# Patient Record
Sex: MALE | Race: WHITE | Employment: FULL TIME | URBAN - METROPOLITAN AREA
[De-identification: names, ages, dates, MRNs, and addresses within clinical notes are randomized per-mention and may not be internally consistent; named-entity substitution may affect disease eponyms.]

---

## 2019-08-08 ENCOUNTER — HOSPITAL ENCOUNTER (OUTPATIENT)
Dept: PHYSICAL THERAPY | Age: 59
Discharge: HOME | End: 2019-08-08
Payer: COMMERCIAL

## 2019-08-08 PROCEDURE — 97110 THERAPEUTIC EXERCISES: CPT

## 2019-08-08 PROCEDURE — 97161 PT EVAL LOW COMPLEX 20 MIN: CPT

## 2019-08-08 PROCEDURE — 97140 MANUAL THERAPY 1/> REGIONS: CPT

## 2019-08-08 NOTE — PROGRESS NOTES
PT DAILY TREATMENT NOTE 10-18    Patient Name: Fabián Lindsey  Date:2019  : 1960  [x]  Patient  Verified  Payor: BLUE CROSS / Plan: [facility] PPO / Product Type: PPO /    In time:1150  Out time:1242  Total Treatment Time (min): 52  Visit #: 1 of 10    Medicare/BCBS Only   Total Timed Codes (min):  23 1:1 Treatment Time:  52       Treatment Area: Knee pain, left [M25.562]      SUBJECTIVE  Pain Level (0-10 scale): 6  [x]constant []intermittent []improving []worsening []no change since onset     Any medication changes, allergies to medications, adverse drug reactions, diagnosis change, or new procedure performed?: [x] No    [] Yes (see summary sheet for update)  Subjective functional status/changes:       Subjective: Pt S/P Left TKR with a Jiffy Knee replacement on 19 due to years of knee pain, . Pt arrives in moderate pain ambulating on [facility]. Knee gets real stiff with bending    Mechanism of Injury: Surgery    Comorbidities: OA, HTN    FOTO:  in 20 visits Limited community ambulation    Goal: Get as much motion as I can get, be able to get to the floor and stand up for work    PLOF: Able to get to the floor and stand with some pain    Health Status: Poor      What type of work do you do?     Living Situation/Domestic Life: Lives at home with wife 13 steps in home and 4 out front all with stair rails  Mobility: Mod (I)  Self Care (I)    What type of daily activities/hobbies?  Shooting, hunting, , Yard work    Limitations to Activity/Recreation/PLOF: Walking, usual work, bending, limited balance     Barriers: []pain []financial []time []transportation []other    Motivation: High    FABQ Score: []low []elevate    Cognition: A & O x 3    Other:    Risk For Falls:   []No  [x]low []elevate           OBJECTIVE/EXAMINATION  Demonstrated Balance: Fair       SLS: Right NT  Left  NT     Tandem Stance:  NT        Romberg:  EO 30   EC 12  Mobility: Mod (I)  Gait: Left antalgic with SPC, Narrow width with ambulation   - TTP ADD and surrounding knee         AROM PROM Strength Pain? ?    Right Left Right Left Right Left    Hip Flx      2+    Hip Ext          Knee Flx  96    4+    Knee Ext  Seated  -10*  -15*    2    DF          PF          Toe DF          Toe PF          HS 90/90          SLR                                       Modality rationale: decrease inflammation, decrease pain and increase tissue extensibility to improve the patients ability to tolerate post treatment soreness   Min Type Additional Details      [] Estim:  []Unatt       []IFC  []Premod                        []Other:  []w/ice   []w/heat  Position:  Location:      [] Estim: []Att    []TENS instruct  []NMES                    []Other:  []w/US   []w/ice   []w/heat  Position:  Location:      []  Traction: [] Cervical       []Lumbar                       [] Prone          []Supine                       []Intermittent   []Continuous Lbs:  [] before manual  [] after manual      []  Ultrasound: []Continuous   [] Pulsed                           []1MHz   []3MHz W/cm2:  Location:      []  Iontophoresis with dexamethasone         Location: [] Take home patch   [] In clinic     10 [x]  Ice     []  heat  []  Ice massage  []  Laser   []  Anodyne Position:  Location:      []  Laser with stim  []  Other:  Position:  Location:      []  Vasopneumatic Device Pressure:       [] lo [] med [] hi   Temperature: [] lo [] med [] hi    [x] Skin assessment post-treatment:  [x]intact []redness- no adverse reaction    []redness  adverse reaction:      19 min [x]Eval                  []Re-Eval         11 min Therapeutic Exercise:  [x] See flow sheet :   Rationale: increase ROM, increase strength and improve coordination to improve the patients ability to perform normal activvity     8 min Manual Therapy:  Left knee Ext/Flx mobs,    Rationale: decrease pain, increase ROM and increase tissue extensibility to tolerate increased activity  4 min Gait Trainin with SPC device on level surfaces with CGA - SBA level of assist   Rationale: With   [x] TE   [] TA   [] neuro   [] other: Patient Education: [x] Review HEP    [] Progressed/Changed HEP based on:   [] positioning   [] body mechanics   [] transfers   [] heat/ice application    [] other:       Other Objective/Functional Measures:        Pain Level (0-10 scale) post treatment: 4-5     ASSESSMENT/Changes in Function:    - Good tolerance with treatment today  - Improved gait pattern with decreased narrowing width      []  See Plan of Care  []  See progress note/recertification  []  See Discharge Summary         Progress towards goals / Updated goals:  Short Term Goals: To be accomplished in 5 treatments:  1. Pt will be compliant and independent with HEP in order to facilitate PT sessions and aid with self management   Eval Status:  Initiated   Current Status:  2. Pt to tolerate 30 min or more of TE and/or Interventions w/o increased s/s   Eval Status:  Initiated   Current Status:    Long Term Goals: To be accomplished in 10 treatments:  1. Pt will report 50% improvement or better with involvement to show a significant increase in function   Eval Status:  Initiated   Current Status:  2. Pt will demonstrate a reciprocal gait pattern with stair walking to tolerate getting up and down his stairs at home. Eval Status:  Initiated   Current Status:  3. Pt will have increased AROM left Knee flx at 120* to aid with stooping and low level work for progress to return to work as a      Eval Status:  Initiated   Current Status:  4. Pt will demonstrate left knee seated extension to -5* or better to show good strength for carryover to good stability with daily activity. Eval Status:  Initiated   Current Status:  5.   Pt will ambulate 500' (I) with no LOB for carryover to light community ambulation   Eval Status: Initiated   Current Status:  6. Pt will improve FOTO score to 34 in 20 visits to show significant improvement in function for progress to limited community ambulation   Eval Status: 1   Current Status:      PLAN  [x]  Upgrade activities as tolerated     [x]  Continue plan of care  []  Update interventions per flow sheet       []  Discharge due to:_  []  Other:_        Janet Joshua, PT 8/8/2019  11:55 AM    No future appointments.

## 2019-08-08 NOTE — PROGRESS NOTES
[facility] at [address]  [place] [address] [address]  Phone: [phone]      Fax:  [phone]    Plan of Care/ Statement of Necessity for Physical Therapy Services  Patient name: Hilario Bentley Start of Care: 2019   Referral source: Abhay Johnson : 1960    Medical Diagnosis: Knee pain, left [M25.562]  Payor: [employer] / Plan: [facility] PPO / Product Type: PPO /  Onset Date:Surgery 19    Treatment Diagnosis: Left LE weakness and limited mobility and stability   Prior Hospitalization: see medical history Provider#: [id number]   Medications: Verified on Patient summary List    Comorbidities: OA, HTN   Prior Level of Function: Able to get to the floor and stand with some pain      The Plan of Care and following information is based on the information from the initial evaluation. Assessment/ key information: Patient is a 66 y.o. male referred to PT with the above Dx. Patient seen today S/P Left TKR with a Jiffy Knee replacement on 19, due to years of knee pain. Pt arrives in moderate pain ambulating on SPC. He reports that his knee gets real stiff with bending. Patient presents to PT with an impaired gait, decreased balance, decreased strength, decreased flexibility, and decreased mobility. Patient s/s appear to be consistent w/ diagnosis. Patient demonstrates the potential to make functional gains within a reasonable time frame. Patient will benefit from skilled PT to address impairments and improve functional mobility, strength, gait and balance for an improved quality of life.   Fall Risk Assessment: Patient demonstrates a low Fall Risk   Evaluation Complexity History LOW Complexity : Zero comorbidities / personal factors that will impact the outcome / POC; Examination LOW Complexity : 1-2 Standardized tests and measures addressing body structure, function, activity limitation and / or participation in recreation ;Presentation LOW Complexity : Stable, uncomplicated  ;Clinical Decision Making HIGH Complexity : FOTO score of 1- 25   Overall Complexity Rating: LOW   Problem List: pain affecting function, decrease ROM, decrease strength, edema affecting function, impaired gait/ balance, decrease ADL/ functional abilitiies, decrease activity tolerance, decrease flexibility/ joint mobility, decrease transfer abilities and other FOTO = 1   Treatment Plan may include any combination of the following: Therapeutic exercise, Therapeutic activities, Neuromuscular re-education, Physical agent/modality, Gait/balance training, Manual therapy, Patient education, Self Care training, Functional mobility training, Home safety training and Stair training  Patient / Family readiness to learn indicated by: asking questions, trying to perform skills and interest  Persons(s) to be included in education: patient (P)  Barriers to Learning/Limitations: None  Patient Goal (s): Get as much motion as I can get, be able to get to the floor and stand up for work  Patient Self Reported Health Status: poor  Rehabilitation Potential: good    Progress towards goals / Updated goals:  Short Term Goals: To be accomplished in 5 treatments:  1. Pt will be compliant and independent with HEP in order to facilitate PT sessions and aid with self management             Eval Status:  Initiated             Current Status:  2. Pt to tolerate 30 min or more of TE and/or Interventions w/o increased s/s             Eval Status:  Initiated             Current Status:     Long Term Goals: To be accomplished in 10 treatments:  1. Pt will report 50% improvement or better with involvement to show a significant increase in function             Eval Status:  Initiated             Current Status:  2. Pt will demonstrate a reciprocal gait pattern with stair walking to tolerate getting up and down his stairs at home.               Eval Status:  Initiated             Current Status:  3. Pt will have increased AROM left Knee flx at 120* to aid with stooping and low level work for progress to return to work as a                Eval Status:  Initiated             Current Status:  4. Pt will demonstrate left knee seated extension to -5* or better to show good strength for carryover to good stability with daily activity. Eval Status:  Initiated             Current Status:  5. Pt will ambulate 500' (I) with no LOB for carryover to light community ambulation             Eval Status:  Initiated             Current Status:  6. Pt will improve FOTO score to 34 in 20 visits to show significant improvement in function for progress to limited community ambulation             Eval Status: 1             Current Status:   Frequency / Duration: Patient to be seen 2-3 times per week for 10 treatments. Patient/ Caregiver education and instruction: Diagnosis, prognosis, self care, activity modification and exercises   [x]  Plan of care has been reviewed with LEATHA Michael, PT 8/8/2019 8:19 AM  _____________________________________________________________________  I certify that the above Therapy Services are being furnished while the patient is under my care. I agree with the treatment plan and certify that this therapy is necessary.     Physician's Signature:____________Date:_________TIME:________    ** Signature, Date and Time must be completed for valid certification **    Please sign and return to [facility] at [address]  [place], [facility] [address]  Phone: [phone]      Fax:  [phone]

## 2019-08-12 ENCOUNTER — HOSPITAL ENCOUNTER (OUTPATIENT)
Dept: PHYSICAL THERAPY | Age: 59
Discharge: HOME | End: 2019-08-12
Payer: COMMERCIAL

## 2019-08-12 PROCEDURE — 97140 MANUAL THERAPY 1/> REGIONS: CPT

## 2019-08-12 PROCEDURE — 97110 THERAPEUTIC EXERCISES: CPT

## 2019-08-12 NOTE — PROGRESS NOTES
PT DAILY TREATMENT NOTE 10-18    Patient Name: Glen Mccall  Date:2019  : 1960  [x]  Patient  Verified  Payor: BLUE CROSS / Plan: [facility] PPO / Product Type: PPO /    In time:2:00  Out time:3:00  Total Treatment Time (min): 60  Visit #: 2 of 10    Medicare/BCBS Only   Total Timed Codes (min):  60 1:1 Treatment Time:  60       Treatment Area: Knee pain, left [M25.562]    SUBJECTIVE  Pain Level (0-10 scale): 4-5/10  Any medication changes, allergies to medications, adverse drug reactions, diagnosis change, or new procedure performed?: [x] No    [] Yes (see summary sheet for update)  Subjective functional status/changes:   [] No changes reported  Pt reports partial compliance with HEP. OBJECTIVE      52 min Therapeutic Exercise:  [] See flow sheet :   Rationale: increase ROM and increase strength to improve the patients ability to perform ADls with less difficulty. 8 min Manual Therapy:  PROM into flexion, grade II ant/post tibiofemoral joint mobs. Rationale: decrease pain, increase ROM and increase tissue extensibility to perform ADls with less difficulty. With   [] TE   [] TA   [] neuro   [] other: Patient Education: [x] Review HEP    [] Progressed/Changed HEP based on:   [] positioning   [] body mechanics   [] transfers   [] heat/ice application    [] other:      Other Objective/Functional Measures:  Left knee AROM flexion 110 deg, ext -8 deg. Pain Level (0-10 scale) post treatment: 0/10    ASSESSMENT/Changes in Function: Initiated ex. Per flow sheet. Pt reported some difficulty with ex. But was able to perform. ROM continues to improve.     Patient will continue to benefit from skilled PT services to modify and progress therapeutic interventions, address functional mobility deficits, address ROM deficits, address strength deficits, analyze and address soft tissue restrictions, analyze and cue movement patterns, analyze and modify body mechanics/ergonomics and assess and modify postural abnormalities to attain remaining goals. []  See Plan of Care  []  See progress note/recertification  []  See Discharge Summary         Progress towards goals / Updated goals:  Short Term Goals: To be accomplished in 5 treatments:  1. Pt will be compliant and independent with HEP in order to facilitate PT sessions and aid with self management             Eval Status:  Initiated             Current Status:Progressing. Pt reports partial compliance. 8/12/19  2. Pt to tolerate 30 min or more of TE and/or Interventions w/o increased s/s             Eval Status:  Initiated             Current Status:     Long Term Goals: To be accomplished in 10 treatments:  1. Pt will report 50% improvement or better with involvement to show a significant increase in function             Eval Status:  Initiated             Current Status:  2. Pt will demonstrate a reciprocal gait pattern with stair walking to tolerate getting up and down his stairs at home. Eval Status:  Initiated             Current Status:  3. Pt will have increased AROM left Knee flx at 120* to aid with stooping and low level work for progress to return to work as a                Eval Status:  Initiated             Current Status:  4. Pt will demonstrate left knee seated extension to -5* or better to show good strength for carryover to good stability with daily activity. Eval Status:  Initiated             Current Status:  5. Pt will ambulate 500' (I) with no LOB for carryover to light community ambulation             Eval Status:  Initiated             Current Status:  6.   Pt will improve FOTO score to 34 in 20 visits to show significant improvement in function for progress to limited community ambulation             Eval Status: 1    PLAN  [x]  Upgrade activities as tolerated     [x]  Continue plan of care  []  Update interventions per flow sheet       []  Discharge due to:_  []  Other:_ Kaitlyn Ugarte PTA 8/12/2019  2:12 PM    Future Appointments   Date Time Provider Rachel Bauer   8/14/2019  2:00 PM Arcelia Lennert [facility]   8/19/2019  2:30 PM Arcelia Lennert [facility]   8/21/2019  2:30 PM Arcelia Lennert [facility]   8/23/2019  8:30 AM Lou Bee PT [facility]   8/26/2019 10:30 AM Arcelia Lennert [facility]   8/28/2019  9:30 AM Arcelia Lennert [facility]   8/30/2019  8:30 AM Arcelia Lennert [facility]   9/3/2019 11:00 AM Ja Bridges, PT [facility]   9/5/2019  9:30 AM Zander Szymanski PTA [facility]   9/9/2019  8:30 AM Zander Szymanski PTA [facility]   9/11/2019  8:00 AM Zander Szymanski PTA [facility]   9/13/2019  8:00 AM Ja Bridges PT [facility]

## 2019-08-14 ENCOUNTER — HOSPITAL ENCOUNTER (OUTPATIENT)
Dept: PHYSICAL THERAPY | Age: 59
Discharge: HOME | End: 2019-08-14
Payer: COMMERCIAL

## 2019-08-14 PROCEDURE — 97110 THERAPEUTIC EXERCISES: CPT

## 2019-08-14 PROCEDURE — 97140 MANUAL THERAPY 1/> REGIONS: CPT

## 2019-08-14 NOTE — PROGRESS NOTES
PT DAILY TREATMENT NOTE 10-18    Patient Name: Maude Wynn  Date:2019  : 1960  [x]  Patient  Verified  Payor: BLUE CROSS / Plan: [facility] PPO / Product Type: PPO /    In time:2:00  Out time:2:40  Total Treatment Time (min): 40  Visit #: 3 of 10    Medicare/BCBS Only   Total Timed Codes (min):  40 1:1 Treatment Time:  40       Treatment Area: Knee pain, left [M25.562]    SUBJECTIVE  Pain Level (0-10 scale): 4-5/10  Any medication changes, allergies to medications, adverse drug reactions, diagnosis change, or new procedure performed?: [x] No    [] Yes (see summary sheet for update)  Subjective functional status/changes:   [] No changes reported  Pt reports partial compliance with HEP. OBJECTIVE      32 min Therapeutic Exercise:  [] See flow sheet :   Rationale: increase ROM and increase strength to improve the patients ability to perform ADls with less difficulty. 8 min Manual Therapy:  PROM into flexion, grade II ant/post tibiofemoral joint mobs. Rationale: decrease pain, increase ROM and increase tissue extensibility to perform ADls with less difficulty. With   [] TE   [] TA   [] neuro   [] other: Patient Education: [x] Review HEP    [] Progressed/Changed HEP based on:   [] positioning   [] body mechanics   [] transfers   [] heat/ice application    [] other:      Other Objective/Functional Measures: Added heel slide    Pain Level (0-10 scale) post treatment: 5-6/10    ASSESSMENT/Changes in Function: Progressed ex. Per flow sheet. Pt reported some difficulty with ex. But was able to perform. ROM continues to improve.     Patient will continue to benefit from skilled PT services to modify and progress therapeutic interventions, address functional mobility deficits, address ROM deficits, address strength deficits, analyze and address soft tissue restrictions, analyze and cue movement patterns, analyze and modify body mechanics/ergonomics and assess and modify postural abnormalities to attain remaining goals. []  See Plan of Care  []  See progress note/recertification  []  See Discharge Summary         Progress towards goals / Updated goals:  Short Term Goals: To be accomplished in 5 treatments:  1. Pt will be compliant and independent with HEP in order to facilitate PT sessions and aid with self management             Eval Status:  Initiated             Current Status:Progressing. Pt reports partial compliance. 8/12/19  2. Pt to tolerate 30 min or more of TE and/or Interventions w/o increased s/s             Eval Status:  Initiated             Current Status:     Long Term Goals: To be accomplished in 10 treatments:  1. Pt will report 50% improvement or better with involvement to show a significant increase in function             Eval Status:  Initiated             Current Status:  2. Pt will demonstrate a reciprocal gait pattern with stair walking to tolerate getting up and down his stairs at home. Eval Status:  Initiated             Current Status:  3. Pt will have increased AROM left Knee flx at 120* to aid with stooping and low level work for progress to return to work as a                Eval Status:  Initiated             Current Status:  4. Pt will demonstrate left knee seated extension to -5* or better to show good strength for carryover to good stability with daily activity. Eval Status:  Initiated             Current Status:  5. Pt will ambulate 500' (I) with no LOB for carryover to light community ambulation             Eval Status:  Initiated             Current Status:  6.   Pt will improve FOTO score to 34 in 20 visits to show significant improvement in function for progress to limited community ambulation             Eval Status: 1    PLAN  [x]  Upgrade activities as tolerated     [x]  Continue plan of care  []  Update interventions per flow sheet       []  Discharge due to:_  []  Other:_      Yasmine Gooden PTA 8/14/2019  2:12 PM    Future Appointments   Date Time Provider Rachel Bauer   8/19/2019  2:30 PM Lucretia Lemons [facility]   8/21/2019  2:30 PM Lucretia Lemons [facility]   8/23/2019  8:30 AM Keely Ruffin, PT [facility]   8/26/2019 10:30 AM Lucretia Lemons [facility]   8/28/2019  9:30 AM Everton Shallow, PTA [facility]   8/30/2019  8:30 AM Everton Shallow, PTA [facility]   9/3/2019 11:00 AM Bell Smoke, PT [facility]   9/5/2019  9:30 AM Everton Shallow, PTA [facility]   9/9/2019  8:30 AM [place] Shallow, PTA [facility]   9/11/2019  8:00 AM [place] Shallow, PTA [facility]   9/13/2019  8:00 AM Bell Smoke, PT [facility]

## 2019-08-19 ENCOUNTER — HOSPITAL ENCOUNTER (OUTPATIENT)
Dept: PHYSICAL THERAPY | Age: 59
Discharge: HOME | End: 2019-08-19
Payer: COMMERCIAL

## 2019-08-19 PROCEDURE — 97110 THERAPEUTIC EXERCISES: CPT

## 2019-08-19 PROCEDURE — 97140 MANUAL THERAPY 1/> REGIONS: CPT

## 2019-08-19 NOTE — PROGRESS NOTES
PT DAILY TREATMENT NOTE 10-18    Patient Name: Sita Fisher  Date:2019  : 1960  [x]  Patient  Verified  Payor: BLUE CROSS / Plan: [facility] PPO / Product Type: PPO /    In time:2:30  Out time:3:25  Total Treatment Time (min): 55  Visit #: 4 of 10    Medicare/BCBS Only   Total Timed Codes (min):  55 1:1 Treatment Time:  40       Treatment Area: Knee pain, left [M25.562]    SUBJECTIVE  Pain Level (0-10 scale): 4/10  Any medication changes, allergies to medications, adverse drug reactions, diagnosis change, or new procedure performed?: [x] No    [] Yes (see summary sheet for update)  Subjective functional status/changes:   [] No changes reported  \"Feel okay. \"    OBJECTIVE    Modality rationale: decrease inflammation and decrease pain to improve the patients ability to perform walking activities with less difficulty.    Min Type Additional Details    [] Estim:  []Unatt       []IFC  []Premod                        []Other:  []w/ice   []w/heat  Position:  Location:    [] Estim: []Att    []TENS instruct  []NMES                    []Other:  []w/US   []w/ice   []w/heat  Position:  Location:    []  Traction: [] Cervical       []Lumbar                       [] Prone          []Supine                       []Intermittent   []Continuous Lbs:  [] before manual  [] after manual    []  Ultrasound: []Continuous   [] Pulsed                           []1MHz   []3MHz Location:  W/cm2:    []  Iontophoresis with dexamethasone         Location: [] Take home patch   [] In clinic   8 [x]  Ice     []  heat  []  Ice massage  []  Laser   []  Anodyne Position: reclined with wedge  Location:left knee    []  Laser with stim  []  Other: Position:  Location:    []  Vasopneumatic Device Pressure:       [] lo [] med [] hi   Temperature: [] lo [] med [] hi   [x] Skin assessment post-treatment:  [x]intact [x]redness- no adverse reaction    []redness  adverse reaction:     39 min Therapeutic Exercise:  [] See flow sheet :   Rationale: increase ROM and increase strength to improve the patients ability to perform ADls with less difficulty. 8 min Manual Therapy:  PROM into flexion, grade II ant/post tibiofemoral joint mobs. Rationale: decrease pain, increase ROM and increase tissue extensibility to perform ADls with less difficulty. With   [] TE   [] TA   [] neuro   [] other: Patient Education: [x] Review HEP    [] Progressed/Changed HEP based on:   [] positioning   [] body mechanics   [] transfers   [] heat/ice application    [] other:      Other Objective/Functional Measures: Left knee flexion 115 deg    Pain Level (0-10 scale) post treatment: 4/10    ASSESSMENT/Changes in Function: Continued with current ex. Per flow sheet. Pt reported fatigue with windshield ex. But was able to perform. AROM continues to improve. Patient will continue to benefit from skilled PT services to modify and progress therapeutic interventions, address functional mobility deficits, address ROM deficits, address strength deficits, analyze and address soft tissue restrictions, analyze and cue movement patterns, analyze and modify body mechanics/ergonomics and assess and modify postural abnormalities to attain remaining goals. []  See Plan of Care  []  See progress note/recertification  []  See Discharge Summary         Progress towards goals / Updated goals:  Short Term Goals: To be accomplished in 5 treatments:  1. Pt will be compliant and independent with HEP in order to facilitate PT sessions and aid with self management             Eval Status:  Initiated             Current Status:Progressing. Pt reports partial compliance. 8/12/19  2. Pt to tolerate 30 min or more of TE and/or Interventions w/o increased s/s             Eval Status:  Initiated             Current Status: MET. Pt is able to tolerate 30 mins of TE without discomfort. 8/19/19      Long Term Goals: To be accomplished in 10 treatments:  1.   Pt will report 50% improvement or better with involvement to show a significant increase in function             Eval Status:  Initiated             Current Status:  2. Pt will demonstrate a reciprocal gait pattern with stair walking to tolerate getting up and down his stairs at home. Eval Status:  Initiated             Current Status:  3. Pt will have increased AROM left Knee flx at 120* to aid with stooping and low level work for progress to return to work as a                Eval Status:  Initiated             Current Status:Progressing. Left knee 115 deg. 8/19/19  4. Pt will demonstrate left knee seated extension to -5* or better to show good strength for carryover to good stability with daily activity. Eval Status:  Initiated             Current Status:  5. Pt will ambulate 500' (I) with no LOB for carryover to light community ambulation             Eval Status:  Initiated             Current Status:  6.   Pt will improve FOTO score to 34 in 20 visits to show significant improvement in function for progress to limited community ambulation             Eval Status: 1    PLAN  [x]  Upgrade activities as tolerated     [x]  Continue plan of care  []  Update interventions per flow sheet       []  Discharge due to:_  []  Other:_      Peggy Perdomo PTA 8/19/2019  2:12 PM    Future Appointments   Date Time Provider Rachel Bauer   8/21/2019  2:30 PM Khurram Doyle [facility]   8/23/2019  8:30 AM PEDRO Tuttle   8/26/2019 10:30 AM Jenaro Shukla PTA [facility]   8/28/2019  9:30 AM Jenaro Shukla PTA [facility]   8/30/2019  8:30 AM Jenaro Shukla PTA [facility]   9/3/2019 11:00 AM Verónica Farooq PT [facility]   9/5/2019  9:30 AM Jenaro Shukla PTA [facility]   9/9/2019  8:30 AM [facility]   9/11/2019  8:00 AM [facility]   9/13/2019  8:00 AM Verónica Farooq, PT [facility]

## 2019-08-21 ENCOUNTER — HOSPITAL ENCOUNTER (OUTPATIENT)
Dept: PHYSICAL THERAPY | Age: 59
Discharge: HOME | End: 2019-08-21
Payer: COMMERCIAL

## 2019-08-21 PROCEDURE — 97110 THERAPEUTIC EXERCISES: CPT

## 2019-08-21 NOTE — PROGRESS NOTES
PT DAILY TREATMENT NOTE 10-18    Patient Name: Thomas Mary  Date:2019  : 1960  [x]  Patient  Verified  Payor: BLUE CROSS / Plan: [facility] PPO / Product Type: PPO /    In time:2:30  Out time:3:15  Total Treatment Time (min): 55  Visit #: 5 of 10    Medicare/BCBS Only   Total Timed Codes (min):  37 1:1 Treatment Time:  30       Treatment Area: Knee pain, left [M25.562]    SUBJECTIVE  Pain Level (0-10 scale): 410  Any medication changes, allergies to medications, adverse drug reactions, diagnosis change, or new procedure performed?: [x] No    [] Yes (see summary sheet for update)  Subjective functional status/changes:   [] No changes reported  \"Feel okay. \"    OBJECTIVE    Modality rationale: decrease inflammation and decrease pain to improve the patients ability to perform walking activities with less difficulty.    Min Type Additional Details    [] Estim:  []Unatt       []IFC  []Premod                        []Other:  []w/ice   []w/heat  Position:  Location:    [] Estim: []Att    []TENS instruct  []NMES                    []Other:  []w/US   []w/ice   []w/heat  Position:  Location:    []  Traction: [] Cervical       []Lumbar                       [] Prone          []Supine                       []Intermittent   []Continuous Lbs:  [] before manual  [] after manual    []  Ultrasound: []Continuous   [] Pulsed                           []1MHz   []3MHz Location:  W/cm2:    []  Iontophoresis with dexamethasone         Location: [] Take home patch   [] In clinic   8 [x]  Ice     []  heat  []  Ice massage  []  Laser   []  Anodyne Position: reclined with wedge  Location:left knee    []  Laser with stim  []  Other: Position:  Location:    []  Vasopneumatic Device Pressure:       [] lo [] med [] hi   Temperature: [] lo [] med [] hi   [x] Skin assessment post-treatment:  [x]intact [x]redness- no adverse reaction    []redness  adverse reaction:     37 min Therapeutic Exercise:  [] See flow sheet :   Rationale: increase ROM and increase strength to improve the patients ability to perform ADls with less difficulty. With   [] TE   [] TA   [] neuro   [] other: Patient Education: [x] Review HEP    [] Progressed/Changed HEP based on:   [] positioning   [] body mechanics   [] transfers   [] heat/ice application    [] other:      Other Objective/Functional Measures:     Pain Level (0-10 scale) post treatment: 4-5/10    ASSESSMENT/Changes in Function: Continued with current ex. Per flow sheet. Pt continues to report fatigue and increased pain/discomfort with ex. But was able to perform all ex. Patient will continue to benefit from skilled PT services to modify and progress therapeutic interventions, address functional mobility deficits, address ROM deficits, address strength deficits, analyze and address soft tissue restrictions, analyze and cue movement patterns, analyze and modify body mechanics/ergonomics and assess and modify postural abnormalities to attain remaining goals. []  See Plan of Care  []  See progress note/recertification  []  See Discharge Summary         Progress towards goals / Updated goals:  Short Term Goals: To be accomplished in 5 treatments:  1. Pt will be compliant and independent with HEP in order to facilitate PT sessions and aid with self management             Eval Status:  Initiated             Current Status:Progressing. Pt reports partial compliance. 8/12/19  2. Pt to tolerate 30 min or more of TE and/or Interventions w/o increased s/s             Eval Status:  Initiated             Current Status: MET. Pt is able to tolerate 30 mins of TE without discomfort. 8/19/19      Long Term Goals: To be accomplished in 10 treatments:  1. Pt will report 50% improvement or better with involvement to show a significant increase in function             Eval Status:  Initiated             Current Status:  2.   Pt will demonstrate a reciprocal gait pattern with stair walking to tolerate getting up and down his stairs at home. Eval Status:  Initiated             Current Status:  3. Pt will have increased AROM left Knee flx at 120* to aid with stooping and low level work for progress to return to work as a                Eval Status:  Initiated             Current Status:Progressing. Left knee 115 deg. 8/19/19  4. Pt will demonstrate left knee seated extension to -5* or better to show good strength for carryover to good stability with daily activity. Eval Status:  Initiated             Current Status:  5. Pt will ambulate 500' (I) with no LOB for carryover to light community ambulation             Eval Status:  Initiated             Current Status:  6.   Pt will improve FOTO score to 34 in 20 visits to show significant improvement in function for progress to limited community ambulation             Eval Status: 1    PLAN  [x]  Upgrade activities as tolerated     [x]  Continue plan of care  []  Update interventions per flow sheet       []  Discharge due to:_  []  Other:_      Becky Hill PTA 8/21/2019  2:12 PM    Future Appointments   Date Time Provider Rachel Bauer   8/23/2019  8:30 AM Luis Payne PT [facility]   8/26/2019 10:30 AM Vince Lane PTA [facility]   8/28/2019  9:30 AM Vince Lane PTA [facility]   8/30/2019  8:30 AM Vince Lane PTA [facility]   9/3/2019 11:00 AM Lidia Smallwood PT [facility]   9/5/2019  9:30 AM Vince Lane PTA [facility]   9/9/2019  8:30 AM Vince Lane PTA [facility]   9/11/2019  8:00 AM Vince Lane PTA [facility]   9/13/2019  8:00 AM Lidia Smallwood, [facility]

## 2019-08-23 ENCOUNTER — HOSPITAL ENCOUNTER (OUTPATIENT)
Dept: PHYSICAL THERAPY | Age: 59
Discharge: HOME | End: 2019-08-23
Payer: COMMERCIAL

## 2019-08-23 PROCEDURE — 97140 MANUAL THERAPY 1/> REGIONS: CPT

## 2019-08-23 PROCEDURE — 97110 THERAPEUTIC EXERCISES: CPT

## 2019-08-23 NOTE — PROGRESS NOTES
PT DAILY TREATMENT NOTE 10-18    Patient Name: Dexter Abarca  Date:2019  : 1960  [x]  Patient  Verified  Payor: BLUE CROSS / Plan: [id number] PPO / Product Type: PPO /    In time:8:30  Out time:9:40  Total Treatment Time (min): 70  Visit #: 6 of 10    Medicare/BCBS Only   Total Timed Codes (min):  62 1:1 Treatment Time:  62       Treatment Area: Knee pain, left [M25.562]    SUBJECTIVE  Pain Level (0-10 scale): 10  Any medication changes, allergies to medications, adverse drug reactions, diagnosis change, or new procedure performed?: [x] No    [] Yes (see summary sheet for update)  Subjective functional status/changes:   [] No changes reported  \"Feel okay. \"    OBJECTIVE    Modality rationale: decrease inflammation and decrease pain to improve the patients ability to perform walking activities with less difficulty.    Min Type Additional Details    [] Estim:  []Unatt       []IFC  []Premod                        []Other:  []w/ice   []w/heat  Position:  Location:    [] Estim: []Att    []TENS instruct  []NMES                    []Other:  []w/US   []w/ice   []w/heat  Position:  Location:    []  Traction: [] Cervical       []Lumbar                       [] Prone          []Supine                       []Intermittent   []Continuous Lbs:  [] before manual  [] after manual    []  Ultrasound: []Continuous   [] Pulsed                           []1MHz   []3MHz Location:  W/cm2:    []  Iontophoresis with dexamethasone         Location: [] Take home patch   [] In clinic   8 [x]  Ice     []  heat  []  Ice massage  []  Laser   []  Anodyne Position: reclined with wedge  Location:left knee    []  Laser with stim  []  Other: Position:  Location:    []  Vasopneumatic Device Pressure:       [] lo [] med [] hi   Temperature: [] lo [] med [] hi   [x] Skin assessment post-treatment:  [x]intact [x]redness- no adverse reaction    []redness  adverse reaction:     54 min Therapeutic Exercise:  [] See flow sheet :   Rationale: increase ROM and increase strength to improve the patients ability to perform ADls with less difficulty. 8 min Manual Therapy:  STM/edema to left knee. Rationale: decrease pain, increase ROM and increase tissue extensibility to perform ADls with less difficulty. With   [] TE   [] TA   [] neuro   [] other: Patient Education: [x] Review HEP    [] Progressed/Changed HEP based on:   [] positioning   [] body mechanics   [] transfers   [] heat/ice application    [] other:      Other Objective/Functional Measures: 55% improvement since SOC. Left knee AROM -3 -115 deg    Pain Level (0-10 scale) post treatment: 5/10    ASSESSMENT/Changes in Function: Increased step height, increased reps. Added 2lbs to SAQ. Pt reported slight discomfort duringheel slide ex., but was able to perform. Patient will continue to benefit from skilled PT services to modify and progress therapeutic interventions, address functional mobility deficits, address ROM deficits, address strength deficits, analyze and address soft tissue restrictions, analyze and cue movement patterns, analyze and modify body mechanics/ergonomics and assess and modify postural abnormalities to attain remaining goals. []  See Plan of Care  []  See progress note/recertification  []  See Discharge Summary         Progress towards goals / Updated goals:  Short Term Goals: To be accomplished in 5 treatments:  1. Pt will be compliant and independent with HEP in order to facilitate PT sessions and aid with self management             Eval Status:  Initiated             Current Status:Progressing. Pt reports partial compliance. 8/12/19  2. Pt to tolerate 30 min or more of TE and/or Interventions w/o increased s/s             Eval Status:  Initiated             Current Status: MET. Pt is able to tolerate 30 mins of TE without discomfort. 8/19/19      Long Term Goals: To be accomplished in 10 treatments:  1.   Pt will report 50% improvement or better with involvement to show a significant increase in function             Eval Status:  Initiated             Current Status:MET. Pt reports 55% improvement since [facility]. 8/23/19  2. Pt will demonstrate a reciprocal gait pattern with stair walking to tolerate getting up and down his stairs at home. Eval Status:  Initiated             Current Status:  3. Pt will have increased AROM left Knee flx at 120* to aid with stooping and low level work for progress to return to work as a                Eval Status:  Initiated             Current Status:Progressing. Left knee 115 deg. 8/23/19  4. Pt will demonstrate left knee seated extension to -5* or better to show good strength for carryover to good stability with daily activity. Eval Status:  Initiated             Current Status:  5. Pt will ambulate 500' (I) with no LOB for carryover to light community ambulation             Eval Status:  Initiated             Current Status:  6.   Pt will improve FOTO score to 34 in 20 visits to show significant improvement in function for progress to limited community ambulation             Eval Status: 1    PLAN  [x]  Upgrade activities as tolerated     [x]  Continue plan of care  []  Update interventions per flow sheet       []  Discharge due to:_  []  Other:_      Alex Marie, PTA 8/23/2019  2:12 PM    Future Appointments   Date Time Provider Rachel Bauer   8/26/2019 10:30 AM Olegopia Chad, PTA [facility]   8/28/2019  9:30 AM Olevia Balling, PTA [facility]   8/30/2019  8:30 AM Olevia Balling, PTA [facility]   9/3/2019 11:00 AM Cuba Cabrera, PT [facility]   9/5/2019  9:30 AM Olegopia Bishnuing, PTA [facility]   9/9/2019  8:30 AM Olegopia Chad, PTA [facility]   9/11/2019  8:00 AM Rocio Bonilla, PTA [facility]   9/13/2019  8:00 AM Cuba Cabrera, PT [facility]

## 2019-08-26 ENCOUNTER — HOSPITAL ENCOUNTER (OUTPATIENT)
Dept: PHYSICAL THERAPY | Age: 59
Discharge: HOME | End: 2019-08-26
Payer: COMMERCIAL

## 2019-08-26 PROCEDURE — 97140 MANUAL THERAPY 1/> REGIONS: CPT

## 2019-08-26 PROCEDURE — 97110 THERAPEUTIC EXERCISES: CPT

## 2019-08-26 NOTE — PROGRESS NOTES
PT DAILY TREATMENT NOTE 10-18    Patient Name: Miguel Ángel Henderson  Date:2019  : 1960  [x]  Patient  Verified  Payor: BLUE CROSS / Plan: [facility] PPO / Product Type: PPO /    In time:10:30  Out time:11:40  Total Treatment Time (min): 60  Visit #: 7 of 10    Medicare/BCBS Only   Total Timed Codes (min):  52 1:1 Treatment Time:  42       Treatment Area: Knee pain, left [M25.562]    SUBJECTIVE  Pain Level (0-10 scale): 2-3/10  Any medication changes, allergies to medications, adverse drug reactions, diagnosis change, or new procedure performed?: [x] No    [] Yes (see summary sheet for update)  Subjective functional status/changes:   [] No changes reported  \"Last night I had that sharp pain right on the top of my knee it went away after a little bit, but it hurt really bad. \"    OBJECTIVE    Modality rationale: decrease inflammation and decrease pain to improve the patients ability to perform walking activities with less difficulty.    Min Type Additional Details    [] Estim:  []Unatt       []IFC  []Premod                        []Other:  []w/ice   []w/heat  Position:  Location:    [] Estim: []Att    []TENS instruct  []NMES                    []Other:  []w/US   []w/ice   []w/heat  Position:  Location:    []  Traction: [] Cervical       []Lumbar                       [] Prone          []Supine                       []Intermittent   []Continuous Lbs:  [] before manual  [] after manual    []  Ultrasound: []Continuous   [] Pulsed                           []1MHz   []3MHz Location:  W/cm2:    []  Iontophoresis with dexamethasone         Location: [] Take home patch   [] In clinic   8 [x]  Ice     []  heat  []  Ice massage  []  Laser   []  Anodyne Position: reclined with wedge  Location:left knee    []  Laser with stim  []  Other: Position:  Location:    []  Vasopneumatic Device Pressure:       [] lo [] med [] hi   Temperature: [] lo [] med [] hi   [x] Skin assessment post-treatment:  [x]intact [x]redness- no adverse reaction    []redness  adverse reaction:     44 min Therapeutic Exercise:  [] See flow sheet :   Rationale: increase ROM and increase strength to improve the patients ability to perform ADls with less difficulty. 8 min Manual Therapy:  PROM into flexion, grade II ant/post tibiofemoral joint mobs. Rationale: decrease pain, increase ROM and increase tissue extensibility to perform ADls with less difficulty. With   [] TE   [] TA   [] neuro   [] other: Patient Education: [x] Review HEP    [] Progressed/Changed HEP based on:   [] positioning   [] body mechanics   [] transfers   [] heat/ice application    [] other:      Other Objective/Functional Measures    Pain Level (0-10 scale) post treatment: 4/10    ASSESSMENT/Changes in Function: Continued with progressed ex. Pt. Continues to report discomfort during heel slide ex. But was able to perform all reps. Advised pt. To not bend knee as far. Patient will continue to benefit from skilled PT services to modify and progress therapeutic interventions, address functional mobility deficits, address ROM deficits, address strength deficits, analyze and address soft tissue restrictions, analyze and cue movement patterns, analyze and modify body mechanics/ergonomics and assess and modify postural abnormalities to attain remaining goals. []  See Plan of Care  []  See progress note/recertification  []  See Discharge Summary         Progress towards goals / Updated goals:  Short Term Goals: To be accomplished in 5 treatments:  1. Pt will be compliant and independent with HEP in order to facilitate PT sessions and aid with self management             Eval Status:  Initiated             Current Status:Progressing. Pt reports partial compliance. 8/12/19  2. Pt to tolerate 30 min or more of TE and/or Interventions w/o increased s/s             Eval Status:  Initiated             Current Status: MET.  Pt is able to tolerate 30 mins of TE without discomfort. 8/19/19      Long Term Goals: To be accomplished in 10 treatments:  1. Pt will report 50% improvement or better with involvement to show a significant increase in function             Eval Status:  Initiated             Current Status:MET. Pt reports 55% improvement since [facility]. 8/23/19  2. Pt will demonstrate a reciprocal gait pattern with stair walking to tolerate getting up and down his stairs at home. Eval Status:  Initiated             Current Status:  3. Pt will have increased AROM left Knee flx at 120* to aid with stooping and low level work for progress to return to work as a                Eval Status:  Initiated             Current Status:Progressing. Left knee 115 deg. 8/23/19  4. Pt will demonstrate left knee seated extension to -5* or better to show good strength for carryover to good stability with daily activity. Eval Status:  Initiated             Current Status:  5. Pt will ambulate 500' (I) with no LOB for carryover to light community ambulation             Eval Status:  Initiated             Current Status:  6.   Pt will improve FOTO score to 34 in 20 visits to show significant improvement in function for progress to limited community ambulation             Eval Status: 1    PLAN  [x]  Upgrade activities as tolerated     [x]  Continue plan of care  []  Update interventions per flow sheet       []  Discharge due to:_  []  Other:_      Kaleigh Friedman PTA 8/26/2019  2:12 PM    Future Appointments   Date Time Provider Rachel Bauer   8/28/2019  9:30 AM Severo Douglas PTA [facility]   8/30/2019  8:30 AM Severo Douglas PTA [facility]   9/3/2019 11:00 AM Dharmesh Glover, PT [facility]   9/9/2019  8:30 AM Severo Douglas PTA [facility]   9/11/2019  8:00 AM Severo Douglas PTA [facility]   9/13/2019  8:00 AM Dahrmesh Glover, PT [facility]

## 2019-08-28 ENCOUNTER — HOSPITAL ENCOUNTER (OUTPATIENT)
Dept: PHYSICAL THERAPY | Age: 59
Discharge: HOME | End: 2019-08-28
Payer: COMMERCIAL

## 2019-08-28 PROCEDURE — 97110 THERAPEUTIC EXERCISES: CPT

## 2019-08-28 PROCEDURE — 97140 MANUAL THERAPY 1/> REGIONS: CPT

## 2019-08-28 NOTE — PROGRESS NOTES
PT DAILY TREATMENT NOTE 10-18    Patient Name: Stephania Bowen  Date:2019  : 1960  [x]  Patient  Verified  Payor: BLUE CROSS / Plan: [facility] PPO / Product Type: PPO /    In time:9:29  Out time:10:25  Total Treatment Time (min): 57  Visit #: 8 of 10    Medicare/BCBS Only   Total Timed Codes (min):  49 1:1 Treatment Time:  30       Treatment Area: Knee pain, left [M25.562]    SUBJECTIVE  Pain Level (0-10 scale): 4-5/10  Any medication changes, allergies to medications, adverse drug reactions, diagnosis change, or new procedure performed?: [x] No    [] Yes (see summary sheet for update)  Subjective functional status/changes:   [] No changes reported  \"I haven't had any of that sharp pain since Monday morning. \"    OBJECTIVE    Modality rationale: decrease inflammation and decrease pain to improve the patients ability to perform walking activities with less difficulty.    Min Type Additional Details    [] Estim:  []Unatt       []IFC  []Premod                        []Other:  []w/ice   []w/heat  Position:  Location:    [] Estim: []Att    []TENS instruct  []NMES                    []Other:  []w/US   []w/ice   []w/heat  Position:  Location:    []  Traction: [] Cervical       []Lumbar                       [] Prone          []Supine                       []Intermittent   []Continuous Lbs:  [] before manual  [] after manual    []  Ultrasound: []Continuous   [] Pulsed                           []1MHz   []3MHz Location:  W/cm2:    []  Iontophoresis with dexamethasone         Location: [] Take home patch   [] In clinic   8 [x]  Ice     []  heat  []  Ice massage  []  Laser   []  Anodyne Position: reclined with wedge  Location:left knee    []  Laser with stim  []  Other: Position:  Location:    []  Vasopneumatic Device Pressure:       [] lo [] med [] hi   Temperature: [] lo [] med [] hi   [x] Skin assessment post-treatment:  [x]intact [x]redness- no adverse reaction    []redness  adverse reaction: 41 min Therapeutic Exercise:  [] See flow sheet :   Rationale: increase ROM and increase strength to improve the patients ability to perform ADls with less difficulty. 8 min Manual Therapy:  STM/edema massage to left knee, quads and ITB in reclined position. Rationale: decrease pain, increase ROM and increase tissue extensibility to perform ADls with less difficulty. With   [] TE   [] TA   [] neuro   [] other: Patient Education: [x] Review HEP    [] Progressed/Changed HEP based on:   [] positioning   [] body mechanics   [] transfers   [] heat/ice application    [] other:      Other Objective/Functional Measures    Pain Level (0-10 scale) post treatment: 4-5/10    ASSESSMENT/Changes in Function: Continued with progressed ex. Pt continues to feel challenged with ex. But was able to perform with less discomfort. Patient will continue to benefit from skilled PT services to modify and progress therapeutic interventions, address functional mobility deficits, address ROM deficits, address strength deficits, analyze and address soft tissue restrictions, analyze and cue movement patterns, analyze and modify body mechanics/ergonomics and assess and modify postural abnormalities to attain remaining goals. []  See Plan of Care  []  See progress note/recertification  []  See Discharge Summary         Progress towards goals / Updated goals:  Short Term Goals: To be accomplished in 5 treatments:  1. Pt will be compliant and independent with HEP in order to facilitate PT sessions and aid with self management             Eval Status:  Initiated             Current Status:Progressing. Pt reports partial compliance. 8/12/19  2. Pt to tolerate 30 min or more of TE and/or Interventions w/o increased s/s             Eval Status:  Initiated             Current Status: MET. Pt is able to tolerate 30 mins of TE without discomfort. 8/19/19      Long Term Goals: To be accomplished in 10 treatments:  1.   Pt will report 50% improvement or better with involvement to show a significant increase in function             Eval Status:  Initiated             Current Status:MET. Pt reports 55% improvement since [facility]. 8/23/19  2. Pt will demonstrate a reciprocal gait pattern with stair walking to tolerate getting up and down his stairs at home. Eval Status:  Initiated             Current Status:  3. Pt will have increased AROM left Knee flx at 120* to aid with stooping and low level work for progress to return to work as a                Eval Status:  Initiated             Current Status:Progressing. Left knee 115 deg. 8/23/19  4. Pt will demonstrate left knee seated extension to -5* or better to show good strength for carryover to good stability with daily activity. Eval Status:  Initiated             Current Status:  5. Pt will ambulate 500' (I) with no LOB for carryover to light community ambulation             Eval Status:  Initiated             Current Status:  6.   Pt will improve FOTO score to 34 in 20 visits to show significant improvement in function for progress to limited community ambulation             Eval Status: 1    PLAN  [x]  Upgrade activities as tolerated     [x]  Continue plan of care  []  Update interventions per flow sheet       []  Discharge due to:_  []  Other:_      Juju Mercado PTA 8/28/2019  2:12 PM    Future Appointments   Date Time Provider Rachel Bauer   8/30/2019  8:30 AM Charley Stewart [facility]   9/3/2019 11:00 AM Tonya Kilgore, PT [facility]   9/9/2019  8:30 AM Julius Fang PTA [facility]   9/11/2019  8:00 AM Julius Fang PTA [facility]   9/13/2019  8:00 AM Tonya Kilgore PT [facility]

## 2019-08-30 ENCOUNTER — HOSPITAL ENCOUNTER (OUTPATIENT)
Dept: PHYSICAL THERAPY | Age: 59
Discharge: HOME | End: 2019-08-30
Payer: COMMERCIAL

## 2019-08-30 PROCEDURE — 97110 THERAPEUTIC EXERCISES: CPT

## 2019-08-30 PROCEDURE — 97140 MANUAL THERAPY 1/> REGIONS: CPT

## 2019-08-30 NOTE — PROGRESS NOTES
PT DAILY TREATMENT NOTE 10-18    Patient Name: Rey Pena  Date:2019  : 1960  [x]  Patient  Verified  Payor: BLUE CROSS / Plan: [facility] PPO / Product Type: PPO /    In time:8:30  Out time 9:35  Total Treatment Time (min): 65  Visit #: 9 of 10    Medicare/BCBS Only   Total Timed Codes (min):  57 1:1 Treatment Time:  50       Treatment Area: Knee pain, left [M25.562]    SUBJECTIVE  Pain Level (0-10 scale): 5/10  Any medication changes, allergies to medications, adverse drug reactions, diagnosis change, or new procedure performed?: [x] No    [] Yes (see summary sheet for update)  Subjective functional status/changes:   [] No changes reported  \"I'm sore today, I installed a toilet yesterday and just had a long day of running around. \"    OBJECTIVE    Modality rationale: decrease inflammation and decrease pain to improve the patients ability to perform walking activities with less difficulty.    Min Type Additional Details    [] Estim:  []Unatt       []IFC  []Premod                        []Other:  []w/ice   []w/heat  Position:  Location:    [] Estim: []Att    []TENS instruct  []NMES                    []Other:  []w/US   []w/ice   []w/heat  Position:  Location:    []  Traction: [] Cervical       []Lumbar                       [] Prone          []Supine                       []Intermittent   []Continuous Lbs:  [] before manual  [] after manual    []  Ultrasound: []Continuous   [] Pulsed                           []1MHz   []3MHz Location:  W/cm2:    []  Iontophoresis with dexamethasone         Location: [] Take home patch   [] In clinic   8 [x]  Ice     []  heat  []  Ice massage  []  Laser   []  Anodyne Position: reclined with wedge  Location:left knee    []  Laser with stim  []  Other: Position:  Location:    []  Vasopneumatic Device Pressure:       [] lo [] med [] hi   Temperature: [] lo [] med [] hi   [x] Skin assessment post-treatment:  [x]intact [x]redness- no adverse reaction []redness  adverse reaction:     49 min Therapeutic Exercise:  [] See flow sheet :   Rationale: increase ROM and increase strength to improve the patients ability to perform ADls with less difficulty. 8 min Manual Therapy:  STM/edema massage to left knee, quads and ITB in reclined position. KT to to left knee 3 I strips 100% tension for scar tissue. Rationale: decrease pain, increase ROM and increase tissue extensibility to perform ADls with less difficulty. With   [] TE   [] TA   [] neuro   [] other: Patient Education: [x] Review HEP    [] Progressed/Changed HEP based on:   [] positioning   [] body mechanics   [] transfers   [] heat/ice application    [] other:      Other Objective/Functional Measures: left knee seated extension -12 deg. Left knee extension in supine -9 deg. Pain Level (0-10 scale) post treatment: 3-4/10    ASSESSMENT/Changes in Function: Continued with progressed ex. AROM and strength continues to improve. Patient will continue to benefit from skilled PT services to modify and progress therapeutic interventions, address functional mobility deficits, address ROM deficits, address strength deficits, analyze and address soft tissue restrictions, analyze and cue movement patterns, analyze and modify body mechanics/ergonomics and assess and modify postural abnormalities to attain remaining goals. []  See Plan of Care  []  See progress note/recertification  []  See Discharge Summary         Progress towards goals / Updated goals:  Short Term Goals: To be accomplished in 5 treatments:  1. Pt will be compliant and independent with HEP in order to facilitate PT sessions and aid with self management             Eval Status:  Initiated             Current Status:Progressing. Pt reports partial compliance. 8/12/19  2. Pt to tolerate 30 min or more of TE and/or Interventions w/o increased s/s             Eval Status:  Initiated             Current Status: MET.  Pt is able to tolerate 30 mins of TE without discomfort. 8/19/19      Long Term Goals: To be accomplished in 10 treatments:  1. Pt will report 50% improvement or better with involvement to show a significant increase in function             Eval Status:  Initiated             Current Status:MET. Pt reports 55% improvement since [facility]. 8/23/19  2. Pt will demonstrate a reciprocal gait pattern with stair walking to tolerate getting up and down his stairs at home. Eval Status:  Initiated             Current Status:Progressing. Pt is able to reciprocally ascend and descend stairs but not all the time. 8/30/19  3. Pt will have increased AROM left Knee flx at 120* to aid with stooping and low level work for progress to return to work as a                Eval Status:  Initiated             Current Status:Progressing. Left knee 115 deg. 8/23/19  4. Pt will demonstrate left knee seated extension to -5* or better to show good strength for carryover to good stability with daily activity. Eval Status:  Initiated             Current Status:Progressing. left knee seated extension -12 deg. 8/30/19  5. Pt will ambulate 500' (I) with no LOB for carryover to light community ambulation             Eval Status:  Initiated             Current Status:  6.   Pt will improve FOTO score to 34 in 20 visits to show significant improvement in function for progress to limited community ambulation             Eval Status: 1    PLAN  [x]  Upgrade activities as tolerated     [x]  Continue plan of care  []  Update interventions per flow sheet       []  Discharge due to:_  []  Other:_      Moni Vela PTA 8/30/2019  2:12 PM    Future Appointments   Date Time Provider Rachel Bauer   9/3/2019 11:00 AM Ari Blue, PT [facility]   9/9/2019  8:30 AM Avtar Ceballos, LEATHA [facility]   9/11/2019  8:00 AM Avtar Ceballos PTA [facility]   9/13/2019  8:00 AM Ari Blue PT [facility]

## 2019-09-03 ENCOUNTER — HOSPITAL ENCOUNTER (OUTPATIENT)
Dept: PHYSICAL THERAPY | Age: 59
Discharge: HOME | End: 2019-09-03
Payer: COMMERCIAL

## 2019-09-03 PROCEDURE — 97110 THERAPEUTIC EXERCISES: CPT

## 2019-09-03 NOTE — PROGRESS NOTES
[facility] at [address] [facility]  [address]  Phone: [phone]      Fax:  [phone]    Physician Update  [] Progress Note  [x] Discharge Summary    Therapy Services  Patient name: William Cochran Start of Care: 2019   Referral source: Sandi Eisenmenger, [place] : 1960               Medical Diagnosis: Knee pain, left [M25.562]  Payor: [employer] / Plan: [facility] PPO / Product Type: PPO /  Onset Date:Surgery 19               Treatment Diagnosis: Left LE weakness and limited mobility and stability   Prior Hospitalization: see medical history Provider#: [id number]   Medications: Verified on Patient summary List    Comorbidities: OA, HTN   Prior Level of Function: Able to get to the floor and stand with some pain                            Visits from Start of Care: 10    Missed Visits: 0    Status at Evaluation/Last Progress Note: Patient has shown good progress with this treatment program. Pain as of last visit was 3/10. Patient has shown decreased pain and increased strength and mobility. Patient reports 60-70%% improvement with overall involvement. FOTO score is 54. All STG/LTGs achieved as identified below.     Fall Risk Assessment: Patient demonstrates no Fall Risk but has minor instability with ambulation.      Other Objective/Functional Measures:   - Stair negotiation  Up and down 4 6\" steps x 3 reps with minor hesitation  - Narrow gait with ambulation  - - Improved gait stability and width of gait with VCs  - AROM Left Knee Flx 120 Ext -5          ASSESSMENT/Changes in Function:   - VCs required for an improved gait pattern with decreased narrow gait pattern  - improved gait after treatment  - Pt advises that he is ready to continue his therapy on his own and return to work  - Pt states that he has a pending surgery for his right knee and wants to save PT visits for that surgery    Progress towards goals / Updated goals:  Short Term Goals: To be accomplished in 5 treatments:  1.  Pt will be compliant and independent with HEP in order to facilitate PT sessions and aid with self management             Eval Status:  Initiated             Current Status:Progressing. Pt reports partial compliance. 8/12/19  2.  Pt to tolerate 30 min or more of TE and/or Interventions w/o increased s/s             Eval Status:  Initiated             Current Status: MET. Pt is able to tolerate 30 mins of TE without discomfort. 8/19/19      Long Term Goals: To be accomplished in 10 treatments:  1.  Pt will report 50% improvement or better with involvement to show a significant increase in function             Eval Status:  Initiated             Current Status:MET. Pt reports 60-70% improvement since [facility]. 8/23/19  2.  Pt will demonstrate a reciprocal gait pattern with stair walking to tolerate getting up and down his stairs at home.              Eval Status:  Initiated             Current Status: Met 9/3/19  3.  Pt will have increased AROM left Knee flx at 120* to aid with stooping and low level work for progress to return to work as a                Eval Status:  Initiated             Current Status:Met. Left knee 120 deg. 9/3/19  4.  Pt will demonstrate left knee seated extension to -5* or better to show good strength for carryover to good stability with daily activity.                Eval Status:  Initiated             Current Status: Met at -5* 9/3/19  5.  Pt will ambulate 500' (I) with no LOB for carryover to light community ambulation             Eval Status:  Initiated             Current Status: Met at 26'  6.  Pt will improve FOTO score to 34 in 20 visits to show significant improvement in function for progress to limited community ambulation             Eval Status: Met at 54 9/3/19      ASSESSMENT/RECOMMENDATIONS:  []Continue therapy per initial plan/protocol at a frequency of    x per week for   weeks  []Continue therapy with the following recommended changes:_____________________      _____________________________________________________________________  [x]Discontinue therapy progressing towards or have reached established goals if approved by MD  []Discontinue therapy due to lack of appreciable progress towards goals  []Discontinue therapy due to lack of attendance or compliance  []Await Physician's recommendations/decisions regarding therapy  []Other:________________________________________________________________    Thank you for this referral.   Louise Andrade, PT 9/3/2019 7:53 PM  NOTE TO PHYSICIAN:  [address]   FAX TO [facility]: [phone]  If you are unable to process this request in 24 hours please contact our office: [phone]    []  I have read the above report and request that my patient continue as recommended. []  I have read the above report and request that my patient continue therapy with the following changes/special instructions:________________________________________  []I have read the above report and request that my patient be discharged from therapy.     [de-identified] Signature:____________Date:_________TIME:________    [employer], Date and Time must be completed for valid certification **

## 2019-09-03 NOTE — PROGRESS NOTES
PT DAILY TREATMENT NOTE 10-18    Patient Name: Mitchell Werner  Date:9/3/2019  : 1960  [x]  Patient  Verified  Payor: BLUE CROSS / Plan: [facility] PPO / Product Type: PPO /    In time:11:07  Out time: 1157  Total Treatment Time (min): 50  Visit #: 10 of 10    Medicare/BCBS Only   Total Timed Codes (min):  50 1:1 Treatment Time:  40       Treatment Area: Knee pain, left [M25.562]    SUBJECTIVE  Pain Level (0-10 scale): 3  Any medication changes, allergies to medications, adverse drug reactions, diagnosis change, or new procedure performed?: [x] No    [] Yes (see summary sheet for update)  Subjective functional status/changes:   [] No changes reported  Difficulty with getting off sofa at home. Getting a lot of popping that bothers him. More pain in the right knee with walking stairs. Walk at home in yard for more than a block. Still have trouble sleeping at night. OBJECTIVE        50 min Therapeutic Exercise:  [x] See flow sheet :   Rationale: increase ROM, increase strength and improve coordination to improve the patients ability to perform usual work          With   [] TE   [] TA   [] neuro   [] other: Patient Education: [x] Review HEP    [] Progressed/Changed HEP based on:   [] positioning   [] body mechanics   [] transfers   [] heat/ice application    [] other:      Other Objective/Functional Measures:   - Stair negotiation  Up and down 4 6\" steps x 3 reps with minor hesitation  - Narrow gait with ambulation  - - Improved gait stability and width of gait with VCs  - AROM Left Knee Flx 120 Ext -5      Pain Level (0-10 scale) post treatment: 3    ASSESSMENT/Changes in Function:   - VCs required for an improved gait pattern with decreased narrow gait pattern  - improved gait after treatment  - Pt advises that he needs to return to work and leave some PT treatments for his right knee surgery    Patient has shown good progress with this treatment program. Pain as of last visit was 3/10. Patient has shown decreased pain and increased strength and mobility. Patient reports 60-70%% improvement with overall involvement. FOTO score is 54. All STG/LTGs achieved as identified below. Fall Risk Assessment: Patient demonstrates no Fall Risk but has minor instability with ambulation. Patient will continue to benefit from skilled PT services to modify and progress therapeutic interventions, address functional mobility deficits, address ROM deficits, address strength deficits, analyze and address soft tissue restrictions and analyze and cue movement patterns to attain remaining goals. []  See Plan of Care  []  See progress note/recertification  []  See Discharge Summary         Progress towards goals / Updated goals:  Short Term Goals: To be accomplished in 5 treatments:  1.  Pt will be compliant and independent with HEP in order to facilitate PT sessions and aid with self management             Eval Status:  Initiated             Current Status:Progressing. Pt reports partial compliance. 8/12/19  2.  Pt to tolerate 30 min or more of TE and/or Interventions w/o increased s/s             Eval Status:  Initiated             Current Status: MET. Pt is able to tolerate 30 mins of TE without discomfort. 8/19/19      Long Term Goals: To be accomplished in 10 treatments:  1.  Pt will report 50% improvement or better with involvement to show a significant increase in function             Eval Status:  Initiated             Current Status:MET. Pt reports 60-70% improvement since [facility]. 8/23/19  2.  Pt will demonstrate a reciprocal gait pattern with stair walking to tolerate getting up and down his stairs at home.              Eval Status:  Initiated             Current Status: Met 9/3/19  3.  Pt will have increased AROM left Knee flx at 120* to aid with stooping and low level work for progress to return to work as a                Eval Status:  Initiated             Current Status:Met.  Left knee 120 deg. 9/3/19  4.  Pt will demonstrate left knee seated extension to -5* or better to show good strength for carryover to good stability with daily activity.                Eval Status:  Initiated             Current Status: Met at -5* 9/3/19  5.  Pt will ambulate 500' (I) with no LOB for carryover to light community ambulation             Eval Status:  Initiated             Current Status: Met at 26'  6.  Pt will improve FOTO score to 34 in 20 visits to show significant improvement in function for progress to limited community ambulation             Eval Status: Met at 54 9/3/19    PLAN  [x]  Upgrade activities as tolerated     [x]  Continue plan of care  []  Update interventions per flow sheet       []  Discharge due to:_  []  Other:_      Consuelo Goldberg PT 9/3/2019  11:47 AM    Future Appointments   Date Time Provider Rachel Bauer   9/9/2019  8:30 AM Anne Tran PTA [facility]   9/11/2019  8:00 AM Anne Tran PTA [facility]   9/13/2019  8:00 AM Juan Dooley, PT [facility]

## 2019-09-05 ENCOUNTER — HOSPITAL ENCOUNTER (OUTPATIENT)
Dept: PHYSICAL THERAPY | Age: 59
Discharge: HOME | End: 2019-09-05
Payer: COMMERCIAL

## 2019-09-05 ENCOUNTER — APPOINTMENT (OUTPATIENT)
Dept: PHYSICAL THERAPY | Age: 59
End: 2019-09-05
Payer: COMMERCIAL

## 2019-09-05 PROCEDURE — 97110 THERAPEUTIC EXERCISES: CPT

## 2019-09-05 PROCEDURE — 97530 THERAPEUTIC ACTIVITIES: CPT

## 2019-09-05 NOTE — PROGRESS NOTES
PT DAILY TREATMENT NOTE 10-18    Patient Name: Tatianna Toussaint  Date:2019  : 1960  [x]  Patient  Verified  Payor: BLUE CROSS / Plan: [facility] PPO / Product Type: PPO /    In time:3:32  Out time:4:33  Total Treatment Time (min): 60  Visit #: 1 of 5-10    Medicare/BCBS Only   Total Timed Codes (min):  60 1:1 Treatment Time:  60       Treatment Area: Knee pain, left [M25.562]    SUBJECTIVE  Pain Level (0-10 scale): 3  Any medication changes, allergies to medications, adverse drug reactions, diagnosis change, or new procedure performed?: [x] No    [] Yes (see summary sheet for update)  Subjective functional status/changes:   [] No changes reported  Got a little sore after standing and walking a little long yesterday    OBJECTIVE    52 min Therapeutic Exercise:  [x] See flow sheet :   Rationale: increase ROM, increase strength and improve coordination to improve the patients ability to tolerate normal activity    8 min Therapeutic Activity:  [x]  See flow sheet :   Rationale: increase ROM, increase strength and improve coordination  to improve the patients ability to ambulate in the community           With   [x] TE   [] TA   [] neuro   [] other: Patient Education: [x] Review HEP    [] Progressed/Changed HEP based on:   [] positioning   [] body mechanics   [] transfers   [] heat/ice application    [] other:      Other Objective/Functional Measures:       Pain Level (0-10 scale) post treatment: 2-3    ASSESSMENT/Changes in Function:   - Continue therapy per MD Request  - Increased resistance with left knee ext with good tolerance  - Increased reps per flow sheet with good tolerance  - Good response to added resisted training    Patient will continue to benefit from skilled PT services to modify and progress therapeutic interventions, address functional mobility deficits, address ROM deficits, address strength deficits, analyze and address soft tissue restrictions and analyze and cue movement patterns to attain remaining goals. []  See Plan of Care  [x]  See progress note/recertification  []  See Discharge Summary         Progress towards goals / Updated goals:  1. Pt will report 75% improvement or better with ability to return to usual activity            Status at last note/certification: 35-62%   Current Status:             2.  Pt will ambulate on TM for 1/2 mile with good tolerance for carry over to active community ambulation              Status at last note/certification: Amb 169'   Current Status: Progressing at 1/4 mile in 7'39\"             3.  Pt will Perform sit - stand 3 x 10 Reps with a good pace from a standard chair with no HHA or difficulty               Status at last note/certification: Initiated   Current Status:                          PLAN  [x]  Upgrade activities as tolerated     [x]  Continue plan of care  []  Update interventions per flow sheet       []  Discharge due to:_  []  Other:_      Taran Barbour, PT 9/5/2019  4:13 PM    No future appointments.

## 2019-09-09 ENCOUNTER — APPOINTMENT (OUTPATIENT)
Dept: PHYSICAL THERAPY | Age: 59
End: 2019-09-09
Payer: COMMERCIAL

## 2019-09-09 ENCOUNTER — HOSPITAL ENCOUNTER (OUTPATIENT)
Dept: PHYSICAL THERAPY | Age: 59
Discharge: HOME | End: 2019-09-09
Payer: COMMERCIAL

## 2019-09-09 PROCEDURE — 97110 THERAPEUTIC EXERCISES: CPT

## 2019-09-09 NOTE — PROGRESS NOTES
[facility] at [address] [facility]  [address] [facility]  Phone: [phone]      Fax:  [phone]    Physician Update  [x] Progress Note  [] Discharge Summary    Therapy Services  Patient name: Lissett Arita Start of Care: 2019   Referral source: Javi Canela, [place] : 1960               Medical Diagnosis: Knee pain, left [M25.562]  Payor: [employer] / Plan: [facility] PPO / Product Type: PPO /  Onset Date:Surgery 19               Treatment Diagnosis: Left LE weakness and limited mobility and stability   Prior Hospitalization: see medical history Provider#: [id number]   Medications: Verified on Patient summary List    Comorbidities: OA, HTN   Prior Level of Function: Able to get to the floor and stand with some pain                            Visits from Start of Care: 10    Missed Visits: 0    Status at Evaluation/Last Progress Note: Patient has shown good progress with this treatment program. Pain as of last visit was 3/10. Patient has shown decreased pain and increased strength and mobility. Patient reports 60-70%% improvement with overall involvement. FOTO score is 54. All STG/LTGs achieved as identified below.     Fall Risk Assessment: Patient demonstrates no Fall Risk but has minor instability with ambulation.      Other Objective/Functional Measures:   - Stair negotiation  Up and down 4 6\" steps x 3 reps with minor hesitation  - Narrow gait with ambulation  - - Improved gait stability and width of gait with VCs  - AROM Left Knee Flx 120 Ext -5          ASSESSMENT/Changes in Function:   - VCs required for an improved gait pattern with decreased narrow gait pattern  - improved gait after treatment  - Pt advises that he is ready to continue his therapy on his own and return to work  - Pt states that he has a pending surgery for his right knee and wants to save PT visits for that surgery    Progress towards goals / Updated goals:  Short Term Goals: To be accomplished in 5 treatments:  1.  Pt will be compliant and independent with HEP in order to facilitate PT sessions and aid with self management             Eval Status:  Initiated             Current Status:Progressing. Pt reports partial compliance. 8/12/19  2.  Pt to tolerate 30 min or more of TE and/or Interventions w/o increased s/s             Eval Status:  Initiated             Current Status: MET. Pt is able to tolerate 30 mins of TE without discomfort. 8/19/19      Long Term Goals: To be accomplished in 10 treatments:  1.  Pt will report 50% improvement or better with involvement to show a significant increase in function             Eval Status:  Initiated             Current Status:MET. Pt reports 60-70% improvement since [facility]. 8/23/19  2.  Pt will demonstrate a reciprocal gait pattern with stair walking to tolerate getting up and down his stairs at home.              Eval Status:  Initiated             Current Status: Met 9/3/19  3.  Pt will have increased AROM left Knee flx at 120* to aid with stooping and low level work for progress to return to work as a                Eval Status:  Initiated             Current Status:Met. Left knee 120 deg. 9/3/19  4.  Pt will demonstrate left knee seated extension to -5* or better to show good strength for carryover to good stability with daily activity.                Eval Status:  Initiated             Current Status: Met at -5* 9/3/19  5.  Pt will ambulate 500' (I) with no LOB for carryover to light community ambulation             Eval Status:  Initiated             Current Status: Met at 26'  6.  Pt will improve FOTO score to 34 in 20 visits to show significant improvement in function for progress to limited community ambulation             Eval Status: Met at 54 9/3/19      ASSESSMENT/RECOMMENDATIONS:  [x]Continue therapy per initial plan/protocol at a frequency of 2-3   x per week for 10 visits  []Continue therapy with the following recommended changes:_____________________      _____________________________________________________________________  []Discontinue therapy progressing towards or have reached established goals   []Discontinue therapy due to lack of appreciable progress towards goals  []Discontinue therapy due to lack of attendance or compliance  []Await Physician's recommendations/decisions regarding therapy  []Other:________________________________________________________________    Thank you for this referral.   Ashleigh Marie, PT 9/9/2019 7:53 PM  NOTE TO PHYSICIAN:  PLEASE COMPLETE THE ORDERS BELOW AND   FAX TO [facility]: [phone]  If you are unable to process this request in 24 hours please contact our office: [phone]    []  I have read the above report and request that my patient continue as recommended. []  I have read the above report and request that my patient continue therapy with the following changes/special instructions:________________________________________  []I have read the above report and request that my patient be discharged from therapy.     [de-identified] Signature:____________Date:_________TIME:________    [employer], Date and Time must be completed for valid certification **

## 2019-09-09 NOTE — PROGRESS NOTES
PT DAILY TREATMENT NOTE 10-18    Patient Name: Alissa Bowen  Date:2019  : 1960  [x]  Patient  Verified  Payor: BLUE CROSS / Plan: [facility] PPO / Product Type: PPO /    In time:1035  Out time:1140  Total Treatment Time (min): 72  Visit #: 2 of 10    Medicare/BCBS Only   Total Timed Codes (min):  55 1:1 Treatment Time:  55       Treatment Area: Knee pain, left [M25.562]    SUBJECTIVE  Pain Level (0-10 scale): 4-5  Any medication changes, allergies to medications, adverse drug reactions, diagnosis change, or new procedure performed?: [x] No    [] Yes (see summary sheet for update)  Subjective functional status/changes:   [] No changes reported  Tried to sleep in his bed last night and having much more pain today    OBJECTIVE    Modality rationale: decrease inflammation, decrease pain and increase tissue extensibility to improve the patients ability to tolerate increased activity   Min Type Additional Details    [] Estim:  []Unatt       []IFC  []Premod                        []Other:  []w/ice   []w/heat  Position:  Location:    [] Estim: []Att    []TENS instruct  []NMES                    []Other:  []w/US   []w/ice   []w/heat  Position:  Location:    []  Traction: [] Cervical       []Lumbar                       [] Prone          []Supine                       []Intermittent   []Continuous Lbs:  [] before manual  [] after manual    []  Ultrasound: []Continuous   [] Pulsed                           []1MHz   []3MHz W/cm2:  Location:    []  Iontophoresis with dexamethasone         Location: [] Take home patch   [] In clinic   10 [x]  Ice     []  heat  []  Ice massage  []  Laser   []  Anodyne Position:  Location:    []  Laser with stim  []  Other:  Position:  Location:    []  Vasopneumatic Device Pressure:       [] lo [] med [] hi   Temperature: [] lo [] med [] hi   [x] Skin assessment post-treatment:  [x]intact []redness- no adverse reaction    []redness  adverse reaction:     55 min Therapeutic Exercise:  [x] See flow sheet : Pt education and instruction regarding left knee popping   Rationale: increase ROM, increase strength and improve coordination to improve the patients ability to tolerate increased activity          With   [x] TE   [] TA   [] neuro   [] other: Patient Education: [x] Review HEP    [] Progressed/Changed HEP based on:   [] positioning   [] body mechanics   [] transfers   [] heat/ice application    [] other:      Other Objective/Functional Measures:   - Knee Ext -4 Flx 122     Pain Level (0-10 scale) post treatment: 3    ASSESSMENT/Changes in Function:   - Good patella mobility    Patient will continue to benefit from skilled PT services to modify and progress therapeutic interventions, address functional mobility deficits, address ROM deficits, address strength deficits, analyze and address soft tissue restrictions and analyze and cue movement patterns to attain remaining goals. []  See Plan of Care  []  See progress note/recertification  []  See Discharge Summary           Progress towards goals / Updated goals:  1. Pt will report 75% improvement or better with ability to return to usual activity                  Status at last note/certification: 67-65%              Current Status:             2. Pt will ambulate on TM for 1/2 mile with good tolerance for carry over to active community ambulation                         Status at last note/certification: Amb 933'              Current Status: Progressing at 1/4 mile in 7'39\" 9/5/19             3.   Pt will Perform sit - stand 3 x 10 Reps with a good pace from a standard chair with no HHA or difficulty                          Status at last note/certification: Initiated              Current Status:  Progressing at 2/10 9/9/19                        PLAN  [x]  Upgrade activities as tolerated     [x]  Continue plan of care  []  Update interventions per flow sheet       []  Discharge due to:_  []  Other:_      Wilhemenia Bolus Omega Chow, PT 9/9/2019  11:17 AM    Future Appointments   Date Time Provider Rachel Bauer   9/11/2019  9:30 AM Ihsan Galeas PTA [facility]   9/13/2019  9:00 AM Sandra Muhammad, PT [facility]

## 2019-09-11 ENCOUNTER — HOSPITAL ENCOUNTER (OUTPATIENT)
Dept: PHYSICAL THERAPY | Age: 59
Discharge: HOME | End: 2019-09-11
Payer: COMMERCIAL

## 2019-09-11 ENCOUNTER — APPOINTMENT (OUTPATIENT)
Dept: PHYSICAL THERAPY | Age: 59
End: 2019-09-11
Payer: COMMERCIAL

## 2019-09-11 PROCEDURE — 97110 THERAPEUTIC EXERCISES: CPT

## 2019-09-11 PROCEDURE — 97530 THERAPEUTIC ACTIVITIES: CPT

## 2019-09-11 NOTE — PROGRESS NOTES
PT DAILY TREATMENT NOTE 10-18    Patient Name: Tatianna Toussaint  Date:2019  : 1960  [x]  Patient  Verified  Payor: BLUE CROSS / Plan: [facility] PPO / Product Type: PPO /    In time:1000  Out time:1125  Total Treatment Time (min): 75  Visit #: 3 of 10    Medicare/BCBS Only   Total Timed Codes (min):  75 1:1 Treatment Time:  55       Treatment Area: Knee pain, left [M25.562]    SUBJECTIVE  Pain Level (0-10 scale): 3  Any medication changes, allergies to medications, adverse drug reactions, diagnosis change, or new procedure performed?: [x] No    [] Yes (see summary sheet for update)  Subjective functional status/changes:   [] No changes reported  Doing better    OBJECTIVE    Modality rationale: decrease inflammation and decrease pain to improve the patients ability to tolerate increased functional activity    Min Type Additional Details    [] Estim:  []Unatt       []IFC  []Premod                        []Other:  []w/ice   []w/heat  Position:  Location:    [] Estim: []Att    []TENS instruct  []NMES                    []Other:  []w/US   []w/ice   []w/heat  Position:  Location:    []  Traction: [] Cervical       []Lumbar                       [] Prone          []Supine                       []Intermittent   []Continuous Lbs:  [] before manual  [] after manual    []  Ultrasound: []Continuous   [] Pulsed                           []1MHz   []3MHz W/cm2:  Location:    []  Iontophoresis with dexamethasone         Location: [] Take home patch   [] In clinic   10 [x]  Ice     []  heat  []  Ice massage  []  Laser   []  Anodyne Position:  Location:    []  Laser with stim  []  Other:  Position:  Location:    []  Vasopneumatic Device Pressure:       [] lo [] med [] hi   Temperature: [] lo [] med [] hi   [x] Skin assessment post-treatment:  [x]intact []redness- no adverse reaction    []redness  adverse reaction:     51 min Therapeutic Exercise:  [x] See flow sheet :   Rationale: increase ROM, increase strength and improve coordination to improve the patients ability to tolerate increased activity    14 min Therapeutic Activity:  [x]  See flow sheet :   Rationale: increase ROM, increase strength and improve coordination  to improve the patients ability to ambulate in the community           With   [] TE   [] TA   [] neuro   [] other: Patient Education: [x] Review HEP    [] Progressed/Changed HEP based on:   [] positioning   [] body mechanics   [] transfers   [] heat/ice application    [] other:      Other Objective/Functional Measures:       Pain Level (0-10 scale) post treatment: 2    ASSESSMENT/Changes in Function:   - Pt showing improved function and strength with sitting and squat activity  - Increased ambulation, pt amb on TM for 1/2 mile in 14'5\" without difficulty    Patient will continue to benefit from skilled PT services to modify and progress therapeutic interventions, address functional mobility deficits, address ROM deficits, address strength deficits, analyze and address soft tissue restrictions and analyze and cue movement patterns to attain remaining goals.      []  See Plan of Care  []  See progress note/recertification  []  See Discharge Summary         Progress towards goals / Updated goals:  1.  Pt will report 75% improvement or better with ability to return to usual activity                                        Status at last note/certification: 60-70%              Current Status:             2.  Pt will ambulate on TM for 1/2 mile with good tolerance for carry over to active community ambulation                         Status at last note/certification: Amb 356'              Current Status: Progressing at 1/4 mile in 7'39\" 9/5/19             3.  Pt will Perform sit - stand 3 x 10 Reps with a good pace from a standard chair with no HHA or difficulty                          PBLHPY at last note/certification: Initiated              [facility] Status:  Progressing at 2x10 9/11/19          PLAN  [x]  Upgrade activities as tolerated     [x]  Continue plan of care  []  Update interventions per flow sheet       []  Discharge due to:_  []  Other:_      Cong Jung, PT 9/11/2019  10:58 AM    Future Appointments   Date Time Provider Rachel Bauer   9/13/2019  9:00 AM Bell Vo, PT [facility]

## 2019-09-13 ENCOUNTER — HOSPITAL ENCOUNTER (OUTPATIENT)
Dept: PHYSICAL THERAPY | Age: 59
Discharge: HOME | End: 2019-09-13
Payer: COMMERCIAL

## 2019-09-13 ENCOUNTER — APPOINTMENT (OUTPATIENT)
Dept: PHYSICAL THERAPY | Age: 59
End: 2019-09-13
Payer: COMMERCIAL

## 2019-09-13 PROCEDURE — 97530 THERAPEUTIC ACTIVITIES: CPT

## 2019-09-13 PROCEDURE — 97110 THERAPEUTIC EXERCISES: CPT

## 2019-09-13 NOTE — PROGRESS NOTES
PT DISCHARGE DAILY NOTE AND [id number]    Patient name: Tom Simon Start of Care: 2019   Referral source: Jacob Daily [place] : 1960               Medical Diagnosis: Knee pain, left [M25.562]  Payor: Ash Quintana / Plan: [employer]iel Monte[employer] PPO / Product Type: PPO /  Onset Date:Surgery 19               Treatment Diagnosis: Left LE weakness and limited mobility and stability   Prior Hospitalization: see medical history Provider#: [id number]   Medications: Verified on Patient summary List    Comorbidities: OA, HTN   Prior Level of Function: Able to get to the floor and stand with some pain    Visits from Start of Care: 14    Missed Visits: 0    Reporting Period : 19 to 19    Date:2019  : 1960  [x]  Patient  Verified  Payor: Ash Quintana / Plan: Ezekiel Montejo PPO / Product Type: PPO /    In time:0900  Out time:1004  Total Treatment Time (min): 64  Visit #: 4 of 10    Medicare/[facility] Only   Total Timed Codes (min):   64 1:1 Treatment Time:  28       SUBJECTIVE  Pain Level (0-10 scale): 0  Any medication changes, allergies to medications, adverse drug reactions, diagnosis change, or new procedure performed?: [x] No    [] Yes (see summary sheet for update)  Subjective functional status/changes:   [] No changes reported  Doing good.   Want to save the rest of the treatments for after my right knee surgery    OBJECTIVE      42 min Therapeutic Exercise:  [x] See flow sheet : Assess for DC   Rationale: increase ROM, increase strength and improve coordination to improve the patients ability to perform normal activity    24 min Therapeutic Activity:  [x]  See flow sheet :  Assess for DC   Rationale: increase ROM, increase strength and improve coordination  to improve the patients ability to return to normal activity           With   [] TE   [] TA   [] neuro   [] other: Patient Education: [x] Review HEP    [] Progressed/Changed HEP based on:   [] positioning   [] body mechanics   [] transfers   [] heat/ice application    [] other:      Other Objective/Functional Measures:   - Pt amb on TM for 1/2 mile in 12'35\" with little increased pain  - Pt demo walking hurdles with improved hip flx and stance phase control  - Improved gait pattern with VCs for improving arm swing, pt demo same     Pain Level (0-10 scale) post treatment: 2    Summary of Care:  Progress towards goals / Updated goals:  1.  Pt will report 75% improvement or better with ability to return to usual activity                                        Status at last note/certification: 60-70%              Current Status:  Met at 75%           2.  Pt will ambulate on TM for 1/2 mile with good tolerance for carry over to active community ambulation                         Status at last note/certification: Amb 004'              Current Status: Met at 1/2 mile in 12'35\"             3.  Pt will Perform sit - stand 3 x 10 Reps with a good pace from a standard chair with no HHA or difficulty                          ZYBPWW at last note/certification: Initiated              Current Status:  Met but pain on right knee 9/13/19             ASSESSMENT/Changes in Function: Patient has shown good progress with this treatment program. Pain as of last visit was 0/10. Patient has shown decreased pain and increased strength and mobility. Patient reports 75% improvement with overall involvement. FOTO score is 64. All STG/LTGs achieved as identified below.     Fall Risk Assessment: Patient demonstrates no Fall Risk     Thank you for this referral!      PLAN  [x]Discontinue therapy: [x]Patient has reached or is progressing toward set goals      []Patient is non-compliant or has abdicated      []Due to lack of appreciable progress towards set goals    Marimar Bellamy, PT 9/13/2019  10:10 AM

## 2020-02-28 ENCOUNTER — HOSPITAL ENCOUNTER (OUTPATIENT)
Dept: PHYSICAL THERAPY | Age: 60
Discharge: HOME | End: 2020-02-28
Payer: COMMERCIAL

## 2020-02-28 PROCEDURE — 97140 MANUAL THERAPY 1/> REGIONS: CPT

## 2020-02-28 PROCEDURE — 97110 THERAPEUTIC EXERCISES: CPT

## 2020-02-28 PROCEDURE — 97164 PT RE-EVAL EST PLAN CARE: CPT

## 2020-02-28 NOTE — PROGRESS NOTES
PT DAILY TREATMENT NOTE/KNEE EVAL 10-18    Patient Name: Dioni Hoang  Date:2020  : 1960  [x]  Patient  Verified  Payor: BLUE CROSS / Plan: [facility] PPO / Product Type: PPO /    In time:931  Out time:  Total Treatment Time (min): 52  Visit #: 1 of 10    Medicare/[employer] Only   Total Timed Codes (min):  24 1:1 Treatment Time:  49       Treatment Area: Pain in left knee [M25.562]      SUBJECTIVE  Pain Level (0-10 scale): 5  [x]constant []intermittent []improving []worsening []no change since onset     Any medication changes, allergies to medications, adverse drug reactions, diagnosis change, or new procedure performed?: [x] No    [] Yes (see summary sheet for update)  Subjective functional status/changes:       Subjective: Patient is a 64 y.o. male referred to PT with the above Dx. Patient seen today for c/o left knee pain after falling off a ladder on Dec 13, 2019. Pt reports initial swelling and inflammation and was admitted to the hospital for pain management. Pt acquired a staff infection and had surgery for correction on 19. Pt ambulate on a SPC since the accident. Limited ability for walking and stair negotiation. No cane use with walking around the house. Received  PT but did not feel that we were able to advance much. Patient presents to PT with an impaired gait, decreased balance, decreased strength, decreased flexibility, and decreased mobility. Patient s/s appear to be consistent w/ diagnosis. Patient demonstrates the potential to make functional gains within a reasonable time frame. Patient will benefit from skilled PT to address impairments and improve functional mobility, strength, gait and balance for an improved quality of life.   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 , Vehicle maintenance    Living Situation/Domestic Life: Lives at home with wife in a two story home with difficulty performing stairs  Mobility: Mod (I)  Self Care Mod (I)    What type of daily activities/hobbies? Doing regular work, Shooting    Limitations to Activity/Recreation/PLOF: Limited lateral movement with competitive shooting     Barriers: []pain []financial []time []transportation []other    Motivation: High    FABQ Score: []low []elevate    Cognition: A & O x 3    Other:    Risk For Falls:   []No  []low []elevate           OBJECTIVE/EXAMINATION  Demonstrated Balance: Good       SLS: Right NT  Left  NT     Tandem Stance:  NT        Romberg:  EO 30   EC 30  Mobility: (I) with periodic use of SOC  Gait: Slow reciprocal with a left antalgic gait with decreased right hip flexion  - TTP medial knee  - Stair negotiation with periodic step to gait/reciprocal gait pattern  - Swelling and discoloration NT     AROM PROM Strength Pain? ?    Right Left Right Left Right Left    Hip Flx     5- 3    Hip Ext          Knee Flx  107    5    Knee Ext  Seated  Supine      -17  -17    5    SLR          Knee Girth  51.3cm                              25 min [x]Eval                  []Re-Eval         10 min Therapeutic Exercise:  [x] See flow sheet : Develop and instruct HEP   Rationale: increase ROM, increase strength and improve coordination to improve the patients ability to tolerate increased activity       14 min Manual Therapy:  Effleurage left knee, KT I-Strip for spacing at the pes anserine area    Rationale: decrease pain, increase ROM and increase tissue extensibility to tolerate increased activity                                                                  With   [x] TE   [] TA   [] neuro   [] other: Patient Education: [x] Review HEP    [] Progressed/Changed HEP based on:   [] positioning   [] body mechanics   [] transfers   [] heat/ice application    [] other:       Other Objective/Functional Measures:        Pain Level (0-10 scale) post treatment: 5     ASSESSMENT/Changes in Function:        [x]  See Plan of Care  []  See progress note/recertification  []  See Discharge Summary         Progress towards goals / Updated goals:  Short Term Goals: To be accomplished in 5 treatments:  1. Pt will be compliant and independent with HEP in order to facilitate PT sessions and aid with self management   Eval Status:  Initiated   Current Status:  2. Pt to tolerate 30 min or more of TE and/or Interventions w/o increased s/s   Eval Status:  Initiated   Current Status:    Long Term Goals: To be accomplished in 10 treatments:  1. Pt will report 50% improvement or better with left knee involvement to show a significant increase in function for carryover to normal walking and usual daily activity with little difficulty   Eval Status:  Initiated   Current Status:  2. Pt will demonstrate the ability to negotiated 20 6\" steps with little to no HHA or difficulty for carryover to negotiating stairs at home with a normal reciprocal gait pattern     Eval Status:  Periodic step to and reciprocal gait pattern   Current Status:  3. Pt will ambulate on TM for 1/2 mile with little to no difficulty for carryover to normal (I) community ambulation with no difficulty     Eval Status:  Require use of SPC with community ambulation   Current Status:  4. Pt will have decreased girth of the left knee by 2 cm or more to allow decreased stiffness and improved mobility to allow performance of normal daily activity without the worry of stiffness limited activity. Eval Status:  Left knee girth at middle of patella, 51.3 cm   Current Status:  5. Pt will improve FOTO score to 57 in 15 visits to show significant improvement in function for progress to independent community ambulation.    Eval Status: 45   Current Status:      PLAN  [x]  Upgrade activities as tolerated     [x]  Continue plan of care  []  Update interventions per flow sheet       []  Discharge due to:_  []  Other:_          Mark Livingston, PT 2/28/2020  9:31 AM

## 2020-02-28 NOTE — PROGRESS NOTES
[facility] at [address] [facility] [address]  [address]. [facility]  Phone: [phone]      Fax:  [phone]    Plan of Care/ Statement of Necessity for Physical Therapy Services  Patient name: Mitzy Quiles Start of Care: 2020   Referral source: Garcia Macias [place] : 1960    Medical Diagnosis: Pain in left knee [M25.562]  Payor: clipsync / Plan: [facility] PPO / Product Type: PPO /  Onset Date:Dec 13, 2019    Treatment Diagnosis: Left knee strain   Prior Hospitalization: see medical history Provider#: [id number]   Medications: Verified on Patient summary List    Comorbidities: None noted   Prior Level of Function: Amb with no AD, able to do normal work without difficulty      The Plan of Care and following information is based on the information from the initial evaluation. Assessment/ key information: Patient is a 64 y.o. male referred to PT with the above Dx. Patient seen today for c/o left knee pain after falling off a ladder on Dec 13, 2019. Pt reports initial swelling and inflammation and was admitted to the hospital for pain management. Pt acquired a staff infection and had surgery for correction on 19. Pt ambulate on a SPC since the accident. Limited ability for walking and stair negotiation. No cane use with walking around the house. Received  PT but did not feel that we were able to advance much.       Patient presents to PT with an impaired gait, decreased balance, decreased strength, decreased flexibility, and decreased mobility. He has swelling of the left knee and decreased left hip flexor strength. Patient s/s appear to be consistent w/ diagnosis. Patient demonstrates the potential to make functional gains within a reasonable time frame. Patient will benefit from skilled PT to address impairments and improve functional mobility, strength, gait and balance for an improved quality of life.   Fall Risk Assessment: Patient demonstrates low Fall Risk   Evaluation Complexity History MEDIUM  Complexity : 1-2 comorbidities / personal factors will impact the outcome/ POC ; Examination LOW Complexity : 1-2 Standardized tests and measures addressing body structure, function, activity limitation and / or participation in recreation  ;Presentation LOW Complexity : Stable, uncomplicated  ;Clinical Decision Making MEDIUM Complexity : FOTO score of 26-74  Overall Complexity Rating: LOW   Problem List: pain affecting function, decrease ROM, decrease strength, edema affecting function, impaired gait/ balance, decrease ADL/ functional abilitiies, decrease activity tolerance, decrease flexibility/ joint mobility, decrease transfer abilities and other FOTO = 45   Treatment Plan may include any combination of the following: Therapeutic exercise, Therapeutic activities, Neuromuscular re-education, Physical agent/modality, Gait/balance training, Manual therapy, Patient education, Self Care training, Functional mobility training, Home safety training and Stair training  Patient / Family readiness to learn indicated by: asking questions, trying to perform skills and interest  Persons(s) to be included in education: patient (P)  Barriers to Learning/Limitations: None  Patient Goal (s): Get back to work and back to how ZI was prior to this happining  Patient Self Reported Health Status: fair  Rehabilitation Potential: good    Short Term Goals: To be accomplished in 5 treatments:  1. Pt will be compliant and independent with HEP in order to facilitate PT sessions and aid with self management             Eval Status:  Initiated             Current Status:  2. Pt to tolerate 30 min or more of TE and/or Interventions w/o increased s/s             Eval Status:  Initiated             Current Status:     Long Term Goals: To be accomplished in 10 treatments:  1.   Pt will report 50% improvement or better with left knee involvement to show a significant increase in function for carryover to normal walking and usual daily activity with little difficulty             Eval Status:  Initiated             Current Status:  2. Pt will demonstrate the ability to negotiated 20 6\" steps with little to no HHA or difficulty for carryover to negotiating stairs at home with a normal reciprocal gait pattern               Eval Status:  Periodic step to and reciprocal gait pattern             Current Status:  3. Pt will ambulate on TM for 1/2 mile with little to no difficulty for carryover to normal (I) community ambulation with no difficulty               Eval Status:  Require use of SPC with community ambulation             Current Status:  4. Pt will have decreased girth of the left knee by 2 cm or more to allow decreased stiffness and improved mobility to allow performance of normal daily activity without the worry of stiffness limited activity. Eval Status:  Left knee girth at middle of patella, 51.3 cm             Current Status:  5. Pt will improve FOTO score to 57 in 15 visits to show significant improvement in function for progress to independent community ambulation. Eval Status: 45             Current Status:     Frequency / Duration: Patient to be seen 2-3 times per week for 10 treatments. Patient/ Caregiver education and instruction: Diagnosis, prognosis, self care, activity modification and exercises   Comments:  Patient provided w/HEP   [x]  Plan of care has been reviewed with LEATHA Mcginnis, PT 2/28/2020 9:30 AM  _____________________________________________________________________  I certify that the above Therapy Services are being furnished while the patient is under my care. I agree with the treatment plan and certify that this therapy is necessary.     Physician's Signature:____________Date:_________TIME:________    [employer], Date and Time must be completed for valid certification **    Please sign and return to [facility] at [facility]  [address] [facility], [facility]  Phone: [phone]      Fax:  [phone]

## 2020-03-02 ENCOUNTER — HOSPITAL ENCOUNTER (OUTPATIENT)
Dept: PHYSICAL THERAPY | Age: 60
Discharge: HOME | End: 2020-03-02
Payer: COMMERCIAL

## 2020-03-02 PROCEDURE — 97110 THERAPEUTIC EXERCISES: CPT

## 2020-03-02 PROCEDURE — 97112 NEUROMUSCULAR REEDUCATION: CPT

## 2020-03-02 NOTE — PROGRESS NOTES
PT DAILY TREATMENT NOTE 10-18    Patient Name: Jem Cabrera  Date:3/2/2020  : 1960  [x]  Patient  Verified  Payor: BLUE CROSS / Plan: [facility] PPO / Product Type: PPO /    In time:8:00  Out time:9:02  Total Treatment Time (min): 52  Visit #: 2 of 10    Medicare/BCBS Only   Total Timed Codes (min):  42 1:1 Treatment Time:  42       Treatment Area: Pain in left knee [M25.562]    SUBJECTIVE  Pain Level (0-10 scale): 5/10  Any medication changes, allergies to medications, adverse drug reactions, diagnosis change, or new procedure performed?: [x] No    [] Yes (see summary sheet for update)  Subjective functional status/changes:   [] No changes reported  \"Yesterday was ruff, today is better. \" Pt. Reports compliance with HEP. OBJECTIVE    Modality rationale: decrease edema, decrease inflammation and decrease pain to improve the patients ability to perform ADLs with less difficulty.    Min Type Additional Details    [] Estim:  []Unatt       []IFC  []Premod                        []Other:  []w/ice   []w/heat  Position:  Location:    [] Estim: []Att    []TENS instruct  []NMES                    []Other:  []w/US   []w/ice   []w/heat  Position:  Location:    []  Traction: [] Cervical       []Lumbar                       [] Prone          []Supine                       []Intermittent   []Continuous Lbs:  [] before manual  [] after manual    []  Ultrasound: []Continuous   [] Pulsed                           []1MHz   []3MHz W/cm2:  Location:    []  Iontophoresis with dexamethasone         Location: [] Take home patch   [] In clinic   10 [x]  Ice     []  heat  []  Ice massage  []  Laser   []  Anodyne Position: reclined with wedge under LE  Location:left knee    []  Laser with stim  []  Other:  Position:  Location:    []  Vasopneumatic Device Pressure:       [] lo [] med [] hi   Temperature: [] lo [] med [] hi   [x] Skin assessment post-treatment:  [x]intact [x]redness- no adverse reaction []redness  adverse reaction:   34 min Therapeutic Exercise:  [] See flow sheet :   Rationale: increase ROM and increase strength to improve the patients ability to perform ADLs with less difficulty. 8 min Neuromuscular Re-education:  []  See flow sheet :KT one 1 strip 100% tension to left pes anserine for spacing. Rationale: increase ROM and increase strength  to improve the patients ability to perform ADls with less difficulty. With   [] TE   [] TA   [] neuro   [] other: Patient Education: [x] Review HEP    [] Progressed/Changed HEP based on:   [] positioning   [] body mechanics   [] transfers   [] heat/ice application    [] other:      Other Objective/Functional Measures: Left knee AROM flexion in supine 112 deg. Pain Level (0-10 scale) post treatment: 4/10    ASSESSMENT/Changes in Function: Initiated ex. Per flow sheet. Pt. Reported some discomfort with all ex. But was able to perform. Performed stretches and HS on both legs due to pt. Compensating. Held manual secondary to pt. Being tender to light touch. Patient will continue to benefit from skilled PT services to modify and progress therapeutic interventions, address functional mobility deficits, address ROM deficits, address strength deficits, analyze and address soft tissue restrictions, analyze and cue movement patterns, analyze and modify body mechanics/ergonomics, assess and modify postural abnormalities and address imbalance/dizziness to attain remaining goals. []  See Plan of Care  []  See progress note/recertification  []  See Discharge Summary         Progress towards goals / Updated goals:  Short Term Goals: To be accomplished in 5 treatments:  1. Pt will be compliant and independent with HEP in order to facilitate PT sessions and aid with self management             Eval Status:  Initiated             Current Status:MET. Pt. Reports compliance. 3/2/20  2.   Pt to tolerate 30 min or more of TE and/or Interventions w/o increased s/s             Eval Status:  Initiated             Current Status:     Long Term Goals: To be accomplished in 10 treatments:  1. Pt will report 50% improvement or better with left knee involvement to show a significant increase in function for carryover to normal walking and usual daily activity with little difficulty             Eval Status:  Initiated             Current Status:  2. Pt will demonstrate the ability to negotiated 20 6\" steps with little to no HHA or difficulty for carryover to negotiating stairs at home with a normal reciprocal gait pattern               Eval Status:  Periodic step to and reciprocal gait pattern             Current Status:  3. Pt will ambulate on TM for 1/2 mile with little to no difficulty for carryover to normal (I) community ambulation with no difficulty               Eval Status:  Require use of SPC with community ambulation             Current Status:  4. Pt will have decreased girth of the left knee by 2 cm or more to allow decreased stiffness and improved mobility to allow performance of normal daily activity without the worry of stiffness limited activity. Eval Status:  Left knee girth at middle of patella, 51.3 cm             Current Status:  5. Pt will improve FOTO score to 57 in 15 visits to show significant improvement in function for progress to independent community ambulation.              Eval Status: 45             Current Status:     PLAN  [x]  Upgrade activities as tolerated     [x]  Continue plan of care  []  Update interventions per flow sheet       []  Discharge due to:_  []  Other:_      Judith Staples PTA 3/2/2020  8:08 AM    Future Appointments   Date Time Provider Rachel Bauer   3/4/2020  8:00 AM Ferdinand Puri PTA [facility]   3/6/2020  9:00 AM Ferdinand Puri PTA [facility]   3[facility]9/2020  [facility]   3/11/2020  8:30 AM Ferdinand Puri PTA [facility]   3/13/2020  9:30 AM Lazaro Ren [facility] [place] [address]

## 2020-03-04 ENCOUNTER — HOSPITAL ENCOUNTER (OUTPATIENT)
Dept: PHYSICAL THERAPY | Age: 60
Discharge: HOME | End: 2020-03-04
Payer: COMMERCIAL

## 2020-03-04 PROCEDURE — 97112 NEUROMUSCULAR REEDUCATION: CPT

## 2020-03-04 PROCEDURE — 97110 THERAPEUTIC EXERCISES: CPT

## 2020-03-04 NOTE — PROGRESS NOTES
PT DAILY TREATMENT NOTE 10-18    Patient Name: Skyler Mode  Date:3/4/2020  : 1960  [x]  Patient  Verified  Payor: BLUE CROSS / Plan: [facility] PPO / Product Type: PPO /    In time:8:00  Out time:9:10  Total Treatment Time (min): 70  Visit #: 3 of 10    Medicare/BCBS Only   Total Timed Codes (min):  60 1:1 Treatment Time:  60       Treatment Area: Pain in left knee [M25.562]    SUBJECTIVE  Pain Level (0-10 scale): 3-4/10  Any medication changes, allergies to medications, adverse drug reactions, diagnosis change, or new procedure performed?: [x] No    [] Yes (see summary sheet for update)  Subjective functional status/changes:   [] No changes reported  \"I feel good, I slept pretty good last night, the pain is a little better. \"    OBJECTIVE    Modality rationale: decrease edema, decrease inflammation and decrease pain to improve the patients ability to perform ADLs with less difficulty.    Min Type Additional Details    [] Estim:  []Unatt       []IFC  []Premod                        []Other:  []w/ice   []w/heat  Position:  Location:    [] Estim: []Att    []TENS instruct  []NMES                    []Other:  []w/US   []w/ice   []w/heat  Position:  Location:    []  Traction: [] Cervical       []Lumbar                       [] Prone          []Supine                       []Intermittent   []Continuous Lbs:  [] before manual  [] after manual    []  Ultrasound: []Continuous   [] Pulsed                           []1MHz   []3MHz W/cm2:  Location:    []  Iontophoresis with dexamethasone         Location: [] Take home patch   [] In clinic   10 [x]  Ice     []  heat  []  Ice massage  []  Laser   []  Anodyne Position: reclined with wedge under LE  Location:left knee    []  Laser with stim  []  Other:  Position:  Location:    []  Vasopneumatic Device Pressure:       [] lo [] med [] hi   Temperature: [] lo [] med [] hi   [x] Skin assessment post-treatment:  [x]intact [x]redness- no adverse reaction []redness  adverse reaction:   52 min Therapeutic Exercise:  [] See flow sheet :   Rationale: increase ROM and increase strength to improve the patients ability to perform ADLs with less difficulty. 8 min Neuromuscular Re-education:  []  See flow sheet :KT one 1 strip 100% tension to left pes anserine for spacing. Rationale: increase ROM and increase strength  to improve the patients ability to perform ADls with less difficulty. With   [] TE   [] TA   [] neuro   [] other: Patient Education: [x] Review HEP    [] Progressed/Changed HEP based on:   [] positioning   [] body mechanics   [] transfers   [] heat/ice application    [] other:      Other Objective/Functional Measures: Left knee AROM flexion in supine 118 deg. Left knee girth 47 cm. Pain Level (0-10 scale) post treatment: 4/10    ASSESSMENT/Changes in Function: Added minisquats to ex. Program per flow sheet to help increase strength and improve functional activities at home. Patient will continue to benefit from skilled PT services to modify and progress therapeutic interventions, address functional mobility deficits, address ROM deficits, address strength deficits, analyze and address soft tissue restrictions, analyze and cue movement patterns, analyze and modify body mechanics/ergonomics, assess and modify postural abnormalities and address imbalance/dizziness to attain remaining goals. []  See Plan of Care  []  See progress note/recertification  []  See Discharge Summary         Progress towards goals / Updated goals:  Short Term Goals: To be accomplished in 5 treatments:  1. Pt will be compliant and independent with HEP in order to facilitate PT sessions and aid with self management             Eval Status:  Initiated             Current Status:MET. Pt. Reports compliance. 3/2/20  2.   Pt to tolerate 30 min or more of TE and/or Interventions w/o increased s/s             Eval Status:  Initiated             Current Status:Progressing. Pt reported pain with heel slides only. 3/4/20     Long Term Goals: To be accomplished in 10 treatments:  1. Pt will report 50% improvement or better with left knee involvement to show a significant increase in function for carryover to normal walking and usual daily activity with little difficulty             Eval Status:  Initiated             Current Status:  2. Pt will demonstrate the ability to negotiated 20 6\" steps with little to no HHA or difficulty for carryover to negotiating stairs at home with a normal reciprocal gait pattern               Eval Status:  Periodic step to and reciprocal gait pattern             Current Status:  3. Pt will ambulate on TM for 1/2 mile with little to no difficulty for carryover to normal (I) community ambulation with no difficulty               Eval Status:  Require use of SPC with community ambulation             Current Status:  4. Pt will have decreased girth of the left knee by 2 cm or more to allow decreased stiffness and improved mobility to allow performance of normal daily activity without the worry of stiffness limited activity. Eval Status:  Left knee girth at middle of patella, 51.3 cm             Current Status:MET. Left knee girth 47 cm. 3/4/20  5. Pt will improve FOTO score to 57 in 15 visits to show significant improvement in function for progress to independent community ambulation.              Eval Status: 45             Current Status:     PLAN  [x]  Upgrade activities as tolerated     [x]  Continue plan of care  []  Update interventions per flow sheet       []  Discharge due to:_  []  Other:_      Tanja Stanford PTA 3/4/2020  8:08 AM    Future Appointments   Date Time Provider Rachel Bauer   3/4/2020  8:00 AM José Miguel Barnes PTA [facility]   3/6/2020  9:00 AM José Miguel Barnes PTA [facility]   3/9/2020  1:00 PM Vaishali Fuentes [facility]   3/11/2020  8:30 AM José Miguel Barnes PTA [facility] 3/13/2020  9:30 AM Jabier Fisher PTA [facility]

## 2020-03-06 ENCOUNTER — HOSPITAL ENCOUNTER (OUTPATIENT)
Dept: PHYSICAL THERAPY | Age: 60
Discharge: HOME | End: 2020-03-06
Payer: COMMERCIAL

## 2020-03-06 PROCEDURE — 97112 NEUROMUSCULAR REEDUCATION: CPT

## 2020-03-06 PROCEDURE — 97110 THERAPEUTIC EXERCISES: CPT

## 2020-03-06 NOTE — PROGRESS NOTES
PT DAILY TREATMENT NOTE 10-18    Patient Name: Silvana Jeffrey  Date:3/6/2020  : 1960  [x]  Patient  Verified  Payor: BLUE CROSS / Plan: [facility] PPO / Product Type: PPO /    In time:9:00  Out time:10:00  Total Treatment Time (min): 60  Visit #: 4 of 10    Medicare/BCBS Only   Total Timed Codes (min):  50 1:1 Treatment Time:  50       Treatment Area: Pain in left knee [M25.562]    SUBJECTIVE  Pain Level (0-10 scale): 2-3/10  Any medication changes, allergies to medications, adverse drug reactions, diagnosis change, or new procedure performed?: [x] No    [] Yes (see summary sheet for update)  Subjective functional status/changes:   [] No changes reported  \"Doing okay. \"    OBJECTIVE    Modality rationale: decrease edema, decrease inflammation and decrease pain to improve the patients ability to perform ADLs with less difficulty.    Min Type Additional Details    [] Estim:  []Unatt       []IFC  []Premod                        []Other:  []w/ice   []w/heat  Position:  Location:    [] Estim: []Att    []TENS instruct  []NMES                    []Other:  []w/US   []w/ice   []w/heat  Position:  Location:    []  Traction: [] Cervical       []Lumbar                       [] Prone          []Supine                       []Intermittent   []Continuous Lbs:  [] before manual  [] after manual    []  Ultrasound: []Continuous   [] Pulsed                           []1MHz   []3MHz W/cm2:  Location:    []  Iontophoresis with dexamethasone         Location: [] Take home patch   [] In clinic   10 [x]  Ice     []  heat  []  Ice massage  []  Laser   []  Anodyne Position: reclined with wedge under LE  Location:left knee    []  Laser with stim  []  Other:  Position:  Location:    []  Vasopneumatic Device Pressure:       [] lo [] med [] hi   Temperature: [] lo [] med [] hi   [x] Skin assessment post-treatment:  [x]intact [x]redness- no adverse reaction    []redness  adverse reaction:   42 min Therapeutic Exercise: [] See flow sheet :   Rationale: increase ROM and increase strength to improve the patients ability to perform ADLs with less difficulty. 8 min Neuromuscular Re-education:  []  See flow sheet :KT one 1 strip 100% tension to left pes anserine for spacing. Rationale: increase ROM and increase strength  to improve the patients ability to perform ADls with less difficulty. With   [] TE   [] TA   [] neuro   [] other: Patient Education: [x] Review HEP    [] Progressed/Changed HEP based on:   [] positioning   [] body mechanics   [] transfers   [] heat/ice application    [] other:      Other Objective/Functional Measures: Ascend/decend reciprocally 20 steps with HHA. Pain Level (0-10 scale) post treatment: 3/10    ASSESSMENT/Changes in Function: Added stairs and supine marches to ex. Per flow sheet. Pt reported being challenged with ex., and a slight increase in pain. Strength continues to improve. Patient will continue to benefit from skilled PT services to modify and progress therapeutic interventions, address functional mobility deficits, address ROM deficits, address strength deficits, analyze and address soft tissue restrictions, analyze and cue movement patterns, analyze and modify body mechanics/ergonomics, assess and modify postural abnormalities and address imbalance/dizziness to attain remaining goals. []  See Plan of Care  []  See progress note/recertification  []  See Discharge Summary         Progress towards goals / Updated goals:  Short Term Goals: To be accomplished in 5 treatments:  1. Pt will be compliant and independent with HEP in order to facilitate PT sessions and aid with self management             Eval Status:  Initiated             Current Status:MET. Pt. Reports compliance. 3/2/20  2. Pt to tolerate 30 min or more of TE and/or Interventions w/o increased s/s             Eval Status:  Initiated             Current Status:Progressing. Pt reported pain with heel slides only. 3/4/20     Long Term Goals: To be accomplished in 10 treatments:  1. Pt will report 50% improvement or better with left knee involvement to show a significant increase in function for carryover to normal walking and usual daily activity with little difficulty             Eval Status:  Initiated             Current Status:  2. Pt will demonstrate the ability to negotiated 20 6\" steps with little to no HHA or difficulty for carryover to negotiating stairs at home with a normal reciprocal gait pattern               Eval Status:  Periodic step to and reciprocal gait pattern             Current Status:  3. Pt will ambulate on TM for 1/2 mile with little to no difficulty for carryover to normal (I) community ambulation with no difficulty               Eval Status:  Require use of SPC with community ambulation             Current Status:  4. Pt will have decreased girth of the left knee by 2 cm or more to allow decreased stiffness and improved mobility to allow performance of normal daily activity without the worry of stiffness limited activity. Eval Status:  Left knee girth at middle of patella, 51.3 cm             Current Status:MET. Left knee girth 47 cm. 3/4/20  5. Pt will improve FOTO score to 57 in 15 visits to show significant improvement in function for progress to independent community ambulation.              Eval Status: 45             Current Status:     PLAN  [x]  Upgrade activities as tolerated     [x]  Continue plan of care  []  Update interventions per flow sheet       []  Discharge due to:_  []  Other:_      Darwin Martinez PTA 3/6/2020  8:08 AM    Future Appointments   Date Time Provider Rachel Bauer   3/9/2020  1:00 PM Xavi Sen [facility]   3/11/2020  8:30 AM Stephane Alexandre PTA [facility]   3/13/2020  9:30 AM Stephane Alexandre PTA [facility]

## 2020-03-09 ENCOUNTER — HOSPITAL ENCOUNTER (OUTPATIENT)
Dept: PHYSICAL THERAPY | Age: 60
Discharge: HOME | End: 2020-03-09
Payer: COMMERCIAL

## 2020-03-09 PROCEDURE — 97110 THERAPEUTIC EXERCISES: CPT

## 2020-03-09 NOTE — PROGRESS NOTES
PT DAILY TREATMENT NOTE 10-18    Patient Name: Terell Martinez  Date:3/9/2020  : 1960  [x]  Patient  Verified  Payor: BLUE CROSS / Plan: [facility] PPO / Product Type: PPO /    In time: 1:00  Out time:2:00  Total Treatment Time (min): 60  Visit #: 5 of 10    Medicare/BCBS Only   Total Timed Codes (min):  50 1:1 Treatment Time:  50       Treatment Area: Pain in left knee [M25.562]    SUBJECTIVE  Pain Level (0-10 scale): 2-3/10  Any medication changes, allergies to medications, adverse drug reactions, diagnosis change, or new procedure performed?: [x] No    [] Yes (see summary sheet for update)  Subjective functional status/changes:   [] No changes reported  \"Doing okay. \"    OBJECTIVE    Modality rationale: decrease edema, decrease inflammation and decrease pain to improve the patients ability to perform ADLs with less difficulty.    Min Type Additional Details    [] Estim:  []Unatt       []IFC  []Premod                        []Other:  []w/ice   []w/heat  Position:  Location:    [] Estim: []Att    []TENS instruct  []NMES                    []Other:  []w/US   []w/ice   []w/heat  Position:  Location:    []  Traction: [] Cervical       []Lumbar                       [] Prone          []Supine                       []Intermittent   []Continuous Lbs:  [] before manual  [] after manual    []  Ultrasound: []Continuous   [] Pulsed                           []1MHz   []3MHz W/cm2:  Location:    []  Iontophoresis with dexamethasone         Location: [] Take home patch   [] In clinic   10 [x]  Ice     []  heat  []  Ice massage  []  Laser   []  Anodyne Position: reclined with wedge under LE  Location:left knee    []  Laser with stim  []  Other:  Position:  Location:    []  Vasopneumatic Device Pressure:       [] lo [] med [] hi   Temperature: [] lo [] med [] hi   [x] Skin assessment post-treatment:  [x]intact [x]redness- no adverse reaction    []redness  adverse reaction:   50 min Therapeutic Exercise: [] See flow sheet :   Rationale: increase ROM and increase strength to improve the patients ability to perform ADLs with less difficulty. With   [] TE   [] TA   [] neuro   [] other: Patient Education: [x] Review HEP    [] Progressed/Changed HEP based on:   [] positioning   [] body mechanics   [] transfers   [] heat/ice application    [] other:      Other Objective/Functional Measures: Left knee AROM 2-120 deg    Pain Level (0-10 scale) post treatment: 3/10    ASSESSMENT/Changes in Function: Added forward and side step ups per flow sheet and supine SLR. Held KT to see if pt. Continues to feel improvement with it. Patient will continue to benefit from skilled PT services to modify and progress therapeutic interventions, address functional mobility deficits, address ROM deficits, address strength deficits, analyze and address soft tissue restrictions, analyze and cue movement patterns, analyze and modify body mechanics/ergonomics, assess and modify postural abnormalities and address imbalance/dizziness to attain remaining goals. []  See Plan of Care  []  See progress note/recertification  []  See Discharge Summary         Progress towards goals / Updated goals:  Short Term Goals: To be accomplished in 5 treatments:  1. Pt will be compliant and independent with HEP in order to facilitate PT sessions and aid with self management             Eval Status:  Initiated             Current Status:MET. Pt. Reports compliance. 3/2/20  2. Pt to tolerate 30 min or more of TE and/or Interventions w/o increased s/s             Eval Status:  Initiated             Current Status:Progressing. Pt reported pain with heel slides only. 3/4/20     Long Term Goals: To be accomplished in 10 treatments:  1.   Pt will report 50% improvement or better with left knee involvement to show a significant increase in function for carryover to normal walking and usual daily activity with little difficulty             Eval Status: Initiated             Current Status:MET. Pt reports 75% improvement since [facility]. 3/9/20  2. Pt will demonstrate the ability to negotiated 20 6\" steps with little to no HHA or difficulty for carryover to negotiating stairs at home with a normal reciprocal gait pattern               Eval Status:  Periodic step to and reciprocal gait pattern             Current Status:Progressing. Pt is able to perform 20 6\" steps with one HHA. 3/9/20  3. Pt will ambulate on TM for 1/2 mile with little to no difficulty for carryover to normal (I) community ambulation with no difficulty               Eval Status:  Require use of SPC with community ambulation             Current Status:  4. Pt will have decreased girth of the left knee by 2 cm or more to allow decreased stiffness and improved mobility to allow performance of normal daily activity without the worry of stiffness limited activity. Eval Status:  Left knee girth at middle of patella, 51.3 cm             Current Status:MET. Left knee girth 47 cm. 3/4/20  5. Pt will improve FOTO score to 57 in 15 visits to show significant improvement in function for progress to independent community ambulation.              Eval Status: 45             Current Status:     PLAN  [x]  Upgrade activities as tolerated     [x]  Continue plan of care  []  Update interventions per flow sheet       []  Discharge due to:_  []  Other:_      Rubia Montilla PTA 3/9/2020  8:08 AM    Future Appointments   Date Time Provider Rachel Bauer   3/11/2020  8:30 AM Humberto Taylor PTA [facility]   3/13/2020  9:30 AM Humberto Taylor PTA [facility]

## 2020-03-11 ENCOUNTER — HOSPITAL ENCOUNTER (OUTPATIENT)
Dept: PHYSICAL THERAPY | Age: 60
Discharge: HOME | End: 2020-03-11
Payer: COMMERCIAL

## 2020-03-11 PROCEDURE — 97110 THERAPEUTIC EXERCISES: CPT

## 2020-03-13 ENCOUNTER — APPOINTMENT (OUTPATIENT)
Dept: PHYSICAL THERAPY | Age: 60
End: 2020-03-13
Payer: COMMERCIAL

## 2020-03-18 ENCOUNTER — APPOINTMENT (OUTPATIENT)
Dept: PHYSICAL THERAPY | Age: 60
End: 2020-03-18
Payer: COMMERCIAL

## 2020-03-20 ENCOUNTER — APPOINTMENT (OUTPATIENT)
Dept: PHYSICAL THERAPY | Age: 60
End: 2020-03-20
Payer: COMMERCIAL

## 2021-02-15 NOTE — PROGRESS NOTES
[facility] at [address]  [address]  Phone: [phone]      Fax:  [phone]    Discharge Summary  Patient name: Koki Mayo Start of Care: 2020   Referral source: Abhay Herron : 1960               Medical Diagnosis: Pain in left knee [M25.562]  Payor: [employer] / Plan: [facility] PPO / Product Type: PPO /  Onset Date:Dec 13, 2019               Treatment Diagnosis: Left knee strain   Prior Hospitalization: see medical history Provider#: [id number]   Medications: Verified on Patient summary List    Comorbidities: None noted   Prior Level of Function: Amb with no AD, able to do normal work without difficulty    Visits from Start of Care: 6    Missed Visits: 3    Reporting Period : 20 to 3/11/20    Goal:  Pt will be compliant and independent with HEP in order to facilitate PT sessions and aid with self management  Status at last note/certification: Initiated  Status at discharge: MET. Pt Reports compliance    Goal:  Pt to tolerate 30 min or more of TE and/or Interventions w/o increased s/s  Status at last note/certification: Inititated  Status at discharge: Not met, progressing, pt reported pain with heel slides only    Goal:  Pt will report 50% improvement or better with left knee involvement to show a significant increase in function for carryover to normal walking and usual daily activity with little difficulty  Status at last note/certification: Inititated  Status at discharge: MET. Pt reports 75% improvement since [facility]    Goal:  Pt will demonstrate the ability to negotiated 20 6\" steps with little to no HHA or difficulty for carryover to negotiating stairs at home with a normal reciprocal gait pattern  Status at last note/certification:  Periodic step to and reciprocal gait pattern  Status at discharge: Not met, progressing.  Pt is able to perform 20 6\" steps with one HHA    Goal: Pt will ambulate on TM for 1/2 mile with little to no difficulty for carryover to normal (I) community ambulation with no difficulty    Status at last note/certification: Require use of SPC with community ambulation  Status at discharge: Not met, progressing. Pt was able to ambulate for 1/4 mile with minimal left knee discomfort    Goal: Pt will have decreased girth of the left knee by 2 cm or more to allow decreased stiffness and improved mobility to allow performance of normal daily activity without the worry of stiffness limited activity  Status at last note/certification:   Left knee girth at middle of patella, 51.3 cm  Status at discharge: MET. Left knee girth 47 cm    Goal:  Pt will improve FOTO score to 57 in 15 visits to show significant improvement in function for progress to independent community ambulation  Status at last note/certification: 45  Status at discharge: Not assessed    Assessment/ Summary of Care: Functional Gains:  able to more easily ascend/descend steps, increased walking tolerance, decreased knee girth  Functional Deficits: some discomfort remaining, never no pain  % improvement: 75%  Pain Average: 2-3/10       Best: 2/10     Worst: 5/10  Discharge Disposition: Pt held due to possible illness, called to check on patient 4/3/20, pt reports he is feeling well and has no pain,swelling, or functional deficits remaining. Pt requested discharge at this time.      RECOMMENDATIONS:  [x]Discontinue therapy: []Patient has reached or is progressing toward set goals      [x]Patient is non-compliant or has abdicated      []Due to lack of appreciable progress towards set [address] GONZALEZ, PTA 2/15/2021 12:34 PM

## 2022-04-12 ENCOUNTER — HOSPITAL ENCOUNTER (OUTPATIENT)
Dept: LAB | Age: 62
Discharge: HOME | End: 2022-04-12
Payer: COMMERCIAL

## 2022-04-12 PROCEDURE — 88305 TISSUE EXAM BY PATHOLOGIST: CPT

## 2023-05-21 RX ORDER — IBUPROFEN 800 MG/1
800 TABLET ORAL EVERY 6 HOURS PRN
COMMUNITY

## 2023-05-21 RX ORDER — ROPINIROLE 2 MG/1
2 TABLET, FILM COATED ORAL
COMMUNITY

## 2023-05-21 RX ORDER — LISINOPRIL 20 MG/1
20 TABLET ORAL NIGHTLY
COMMUNITY

## 2023-05-21 RX ORDER — OXYCODONE AND ACETAMINOPHEN 10; 325 MG/1; MG/1
1 TABLET ORAL EVERY 4 HOURS PRN
COMMUNITY
Start: 2016-05-02

## 2023-05-21 RX ORDER — OMEPRAZOLE 20 MG/1
20 CAPSULE, DELAYED RELEASE ORAL NIGHTLY
COMMUNITY

## 2023-05-21 RX ORDER — ASPIRIN 81 MG/1
81 TABLET ORAL DAILY
COMMUNITY

## 2024-04-17 ENCOUNTER — HOSPITAL ENCOUNTER (OUTPATIENT)
Facility: HOSPITAL | Age: 64
Setting detail: RECURRING SERIES
Discharge: HOME | End: 2024-04-20
Payer: COMMERCIAL

## 2024-04-17 PROCEDURE — 97161 PT EVAL LOW COMPLEX 20 MIN: CPT

## 2024-04-17 NOTE — PROGRESS NOTES
URGENT: Patient was evaluated for a post operative condition and early physical and/or occupational therapy intervention is critical to positive outcomes.  Insurance authorization should be expedited to prevent delay in treatment.      [facility] at [address], [place]  [address]  Phone: [phone]      Fax:  [phone]    Plan of Care/ Statement of Necessity for Physical Therapy Services    Patient name: King Moran Start of Care: 2024   Referral source: Ousmane Brooks* : 1960    Medical Diagnosis: Right knee pain [M25.561]       Onset Date:DOS 4/3/24    Treatment Diagnosis:      M25.561  RIGHT KNEE PAIN                            Prior Hospitalization: see medical history Provider#: [id number]   Medications: Verified on Patient Summary List     Comorbidities:  left TKR 2019, infection after, HTN, appendectomy   Prior Level of Function: walking with SPC, able to complete ADLs independently     The Plan of Care and following information is based on the information from the initial evaluation.  Assessment/ key information: Pt is a 64yo male presenting to therapy with c/c right knee pain s/p right TKR on 4/3/24. Pt currently walking with RW with decreased WB on right LE, decreased heel strike, knee flexion and right foot ER.  Pain increases to 10/10 with increased activity, bending knee, transfers with very challenged with laying supine. Pt demonstrates poor gait mechanics, decreased right knee AROM, decreased right LE strength, unable to stand from 18in chair and needing UE assist, unable to complete SLS, and incision healing with clear tape still covering incision. Pt would benefit from skilled PT services to address the above impairments to allow pt to complete ADLs with increased ease and less pain.     Evaluation Complexity HistoryHIGH Complexity :3+ comorbidities / personal factors will impact the outcome/ POC  ; Examination MEDIUM

## 2024-04-17 NOTE — PROGRESS NOTES
PT DAILY TREATMENT NOTE/KNEE EVAL       Patient Name: King Moran    Date: 2024    : 1960  Insurance: Payor: [employer] [employer] [facility] / Plan: [facility] [employer] [employer] / Product Type: *No Product type* /      Patient  verified yes     Visit #   Current / Total 1 28   Time   In / Out 1100 1140   Pain   In / Out 5 5   Subjective Functional Status/Changes: Hurting      Treatment Area: Right knee pain [M25.561]    SUBJECTIVE  Pain Level (0-10 scale): 5  [x]constant []intermittent []improving []worsening []no change since onset    Any medication changes, allergies to medications, adverse drug reactions, diagnosis change, or new procedure performed?: [x] No    [] Yes (see summary sheet for update)  Subjective functional status/changes:     PLOF: walking with SPC  Mechanism of Injury: right TKR on 4/3/24  Current symptoms/Complaints: follow up . Most pain on lateral thigh. Max pain 10/10 with increased ROM of knee. Fell 2 months ago at movies with missing landing with right foot falling forward and landing on right elbow. Finished HHPT on Monday   Previous Treatment/Compliance: None   PMHx/Surgical Hx: left TKR , infection after, HTN, appendectomy  Work Hx: not working since 2023  Living Situation: 2nd story home, 5  [address]  Pt Goals: \"more knee mobility.\"      OBJECTIVE/EXAMINATION      35 min [x]Eval  - untimed                   Therapeutic Procedures:  Tx Min Billable or 1:1 Min (if diff from Tx Min) Procedure, Rationale, Specifics   5  91819 Self Care/Home Management (timed):  improve patient knowledge and understanding of pain reducing techniques, positioning, home safety, activity modification, diagnosis/prognosis, and physical therapy expectations, procedures and progression  to improve patient's ability to progress to PLOF and address remaining functional goals.  (see flow sheet as applicable)     Details if applicable:            Details if applicable:            Details

## 2024-04-19 ENCOUNTER — HOSPITAL ENCOUNTER (OUTPATIENT)
Facility: HOSPITAL | Age: 64
Setting detail: RECURRING SERIES
Discharge: HOME | End: 2024-04-22
Payer: COMMERCIAL

## 2024-04-19 PROCEDURE — 97112 NEUROMUSCULAR REEDUCATION: CPT

## 2024-04-19 PROCEDURE — 97530 THERAPEUTIC ACTIVITIES: CPT

## 2024-04-19 PROCEDURE — 97110 THERAPEUTIC EXERCISES: CPT

## 2024-04-19 PROCEDURE — 97016 VASOPNEUMATIC DEVICE THERAPY: CPT

## 2024-04-19 NOTE — PROGRESS NOTES
4/19/2024    8:30 AM    Future Appointments   Date Time Provider Department Center   4/19/2024 10:10 AM Josefina Diggs, PT [facility]   4/22/2024  9:30 AM Brigida Topete, PT [facility]   4/24/2024 10:10 AM Josefina Callahan, PTA [facility]   4/26/2024 10:10 AM Josefina Callahan, PTA [facility]   4/29/2024 10:10 AM Josefina Diggs, PT [facility]   5/1/2024 10:10 AM Rena Webber, PT [facility]   5/3/2024 10:10 AM Josefina Diggs, PT [facility]

## 2024-04-22 ENCOUNTER — HOSPITAL ENCOUNTER (OUTPATIENT)
Facility: HOSPITAL | Age: 64
Setting detail: RECURRING SERIES
Discharge: HOME | End: 2024-04-25
Payer: COMMERCIAL

## 2024-04-22 PROCEDURE — 97530 THERAPEUTIC ACTIVITIES: CPT

## 2024-04-22 PROCEDURE — 97110 THERAPEUTIC EXERCISES: CPT

## 2024-04-22 PROCEDURE — 97112 NEUROMUSCULAR REEDUCATION: CPT

## 2024-04-22 NOTE — PROGRESS NOTES
PHYSICAL / OCCUPATIONAL THERAPY - DAILY TREATMENT NOTE     Patient Name: King Moran    Date: 2024    : 1960  Insurance: Payor: [employer] [facility] / Plan: [facility] [employer] [facility] / Product Type: *No Product type* /      Patient  verified Yes     Visit #   Current / Total 3 28   Time   In / Out 9:32 10:22   Pain   In / Out 4-5 5-6   Subjective Functional Status/Changes: Patient stated that he felt sore after doing exercises last session. Patient has a sore on the left lower leg and stated that he had a splinter a week ago and thought he got it all out, but its gotten red and painful. Patient stated his son is calling to make an appointment with PCP today. Therapist advised patient to get seen by MD today. Patient stated that he walked more over the weekend so has increased soreness in right knee/calf following.     Changes to:  Allergies, Med Hx, Sx Hx?   no       TREATMENT AREA =  Right knee pain [M25.561]    OBJECTIVE    Modalities Rationale:     Patient wanted ice at first, but after 5 minutes  stated he would rather go home and ice.     Therapeutic Procedures:  Tx Min Billable or 1:1 Min (if diff from Tx Min) Procedure, Rationale, Specifics   25  31944 Therapeutic Exercise (timed):  increase ROM, strength, coordination, balance, and proprioception to improve patient's ability to progress to PLOF and address remaining functional goals. (see flow sheet as applicable)    Details if applicable:    Seated heel slide [id number] hold.    8  23102 Neuromuscular Re-Education (timed):  improve balance, coordination, kinesthetic sense, posture, core stability and proprioception to improve patient's ability to develop conscious control of individual muscles and awareness of position of extremities in order to progress to PLOF and address remaining functional goals. (see flow sheet as applicable)    Details if applicable:     12  36878 Therapeutic Activity (timed):  use of dynamic

## 2024-04-24 ENCOUNTER — HOSPITAL ENCOUNTER (OUTPATIENT)
Facility: HOSPITAL | Age: 64
Setting detail: RECURRING SERIES
Discharge: HOME | End: 2024-04-27
Payer: COMMERCIAL

## 2024-04-24 PROCEDURE — 97530 THERAPEUTIC ACTIVITIES: CPT

## 2024-04-24 PROCEDURE — 97112 NEUROMUSCULAR REEDUCATION: CPT

## 2024-04-24 PROCEDURE — 97535 SELF CARE MNGMENT TRAINING: CPT

## 2024-04-24 PROCEDURE — 97110 THERAPEUTIC EXERCISES: CPT

## 2024-04-24 NOTE — PROGRESS NOTES
79480 Neuromuscular Re-Education (timed):  improve balance, coordination, kinesthetic sense, posture, core stability and proprioception to improve patient's ability to develop conscious control of individual muscles and awareness of position of extremities in order to progress to PLOF and address remaining functional goals. (see flow sheet as applicable)     Details if applicable:     10  20508 Self Care/Home Management (timed):  improve patient knowledge and understanding of positioning and activity modification  to improve patient's ability to progress to PLOF and address remaining functional goals.  (see flow sheet as applicable)     Details if applicable:     20  79863 Therapeutic Activity (timed):  use of dynamic activities replicating functional movements to increase ROM, strength, coordination, balance, and proprioception in order to improve patient's ability to progress to PLOF and address remaining functional goals.  (see flow sheet as applicable)     Details if applicable:            Details if applicable:     60  MC BC Totals Reminder: bill using total billable min of TIMED therapeutic procedures (example: do not include dry needle or estim unattended, both untimed codes, in totals to left)  8-22 min = 1 unit; 23-37 min = 2 units; 38-52 min = 3 units; 53-67 min = 4 units; 68-82 min = 5 units   Total Total     [x]  Patient Education billed concurrently with other procedures   [x] Review HEP    [] Progressed/Changed HEP, detail:    [] Other detail:       Objective Information/Functional Measures/Assessment    Pt continues to have limited activity tolerance and decreased AROM of right knee, but did demonstrate improved extension to lacking 10 degs today.  Pt noted increased sensitivity to cold with vaso today; apply pillowcase NV as barrier and attempt adding treatment again to assess tolerance.    Patient will continue to benefit from skilled PT / OT services to modify and progress therapeutic

## 2024-04-26 ENCOUNTER — HOSPITAL ENCOUNTER (OUTPATIENT)
Facility: HOSPITAL | Age: 64
Setting detail: RECURRING SERIES
Discharge: HOME | End: 2024-04-29
Payer: COMMERCIAL

## 2024-04-26 PROCEDURE — 97112 NEUROMUSCULAR REEDUCATION: CPT

## 2024-04-26 PROCEDURE — 97530 THERAPEUTIC ACTIVITIES: CPT

## 2024-04-26 PROCEDURE — 97016 VASOPNEUMATIC DEVICE THERAPY: CPT

## 2024-04-26 PROCEDURE — 97110 THERAPEUTIC EXERCISES: CPT

## 2024-04-26 NOTE — PROGRESS NOTES
applicable)     Details if applicable:       15  69851 Neuromuscular Re-Education (timed):  improve balance, coordination, kinesthetic sense, posture, core stability and proprioception to improve patient's ability to develop conscious control of individual muscles and awareness of position of extremities in order to progress to PLOF and address remaining functional goals. (see flow sheet as applicable)     Details if applicable:     13 97530 Therapeutic Activity (timed):  use of dynamic activities replicating functional movements to increase ROM, strength, coordination, balance, and proprioception in order to improve patient's ability to progress to PLOF and address remaining functional goals.  (see flow sheet as applicable)     Details if applicable:      5 [id number] Manual Therapy (timed):  increase ROM and increase tissue extensibility to improve patient's ability to progress to PLOF and address remaining functional goals.  The manual therapy interventions were performed at a separate and distinct time from the therapeutic activities interventions . (see flow sheet as applicable)     Details if applicable:  contract relax, flexion and extension with overpressure into flexion in sitting          Details if applicable:     43 5 MC BC Totals Reminder: bill using total billable min of TIMED therapeutic procedures (example: do not include dry needle or estim unattended, both untimed codes, in totals to left)  8-22 min = 1 unit; 23-37 min = 2 units; 38-52 min = 3 units; 53-67 min = 4 units; 68-82 min = 5 units   Total Total     [x]  Patient Education billed concurrently with other procedures   [x] Review HEP    [] Progressed/Changed HEP, detail:    [] Other detail:       Objective Information/Functional Measures/Assessment    Continued decreased activity tolerance but improved right knee flexion to 80 degs today post manual.    Patient will continue to benefit from skilled PT / OT services to modify and progress

## 2024-04-29 ENCOUNTER — HOSPITAL ENCOUNTER (OUTPATIENT)
Facility: HOSPITAL | Age: 64
Setting detail: RECURRING SERIES
Discharge: HOME | End: 2024-05-02
Payer: COMMERCIAL

## 2024-04-29 PROCEDURE — 97110 THERAPEUTIC EXERCISES: CPT

## 2024-04-29 PROCEDURE — 97530 THERAPEUTIC ACTIVITIES: CPT

## 2024-04-29 PROCEDURE — 97112 NEUROMUSCULAR REEDUCATION: CPT

## 2024-04-29 PROCEDURE — 97016 VASOPNEUMATIC DEVICE THERAPY: CPT

## 2024-04-29 NOTE — PROGRESS NOTES
disability*     5. Pt will be able to walk with without AD with good gait mechanics to return to PLOF  Eval: RW with decreased right LE WB, decreased heel strike, knee flexion, right foot ER  Current: not met: Patient continues to use RW with impaired gait noted (4/22/24)     PLAN  Yes  Continue plan of care  []  Upgrade activities as tolerated  []  Discharge due to :  []  Other:    Josefina Diggs PT    4/29/2024    8:30 AM    Future Appointments   Date Time Provider Department Center   4/29/2024 10:10 AM Josefina Diggs PT [facility]   5/1/2024 10:10 AM Rena Webber, PT [facility]   5/3/2024 10:10 AM Josefina Diggs, PT [facility]   5/6/2024 10:10 AM Josefina Diggs, PT [facility]   5/10/2024 10:10 AM Josefina Callahan, PTA [facility]   5/15/2024 10:10 AM Rena Webber, PT [facility]   5/17/2024 10:10 AM Josefina Diggs, PT [facility]

## 2024-05-01 ENCOUNTER — HOSPITAL ENCOUNTER (OUTPATIENT)
Facility: HOSPITAL | Age: 64
Setting detail: RECURRING SERIES
Discharge: HOME | End: 2024-05-04
Payer: COMMERCIAL

## 2024-05-01 PROCEDURE — 97116 GAIT TRAINING THERAPY: CPT

## 2024-05-01 PROCEDURE — 97110 THERAPEUTIC EXERCISES: CPT

## 2024-05-01 PROCEDURE — 97112 NEUROMUSCULAR REEDUCATION: CPT

## 2024-05-01 PROCEDURE — 97530 THERAPEUTIC ACTIVITIES: CPT

## 2024-05-01 NOTE — PROGRESS NOTES
PHYSICAL / OCCUPATIONAL THERAPY - DAILY TREATMENT NOTE     Patient Name: King Moran    Date: 2024    : 1960  Insurance: Payor: [employer] [facility] / Product Type: *No Product type* /      Patient  verified Yes     Visit #   Current / Total 7 28   Time   In / Out 1009 1125   Pain   In / Out 4 4-5   Subjective Functional Status/Changes: Sore after last time    Changes to:  Allergies, Med Hx, Sx Hx?   no       TREATMENT AREA =  Right knee pain [M25.561]    OBJECTIVE    Modalities Rationale:     decrease edema, decrease inflammation, and decrease pain to improve patient's ability to progress to PLOF and address remaining functional goals.     min [] Estim Unattended, type/location:                                      []  w/ice    []  w/heat    min [] Estim Attended, type/location:                                     []  w/US     []  w/ice    []  w/heat    []  TENS insruct      min []  Mechanical Traction: type/lbs                   []  pro   []  sup   []  int   []  cont    []  before manual    []  after manual    min []  Ultrasound, settings/location:      min []  Iontophoresis w/ dexamethasone, location:                                               []  take home patch       []  in clinic        min  unbilled []  Ice     []  Heat    location/position:     min []  Paraffin,  details:    10 min [x]  Vasopneumatic Device, press/temp: Low, high    min []  Whirlpool / Fluido:    If using vaso (only need to measure limb vaso being performed on)      pre-treatment girth : 50 cm      post-treatment girth : 49 cm      measured at (landmark location) :  joint line     min []  Other:    Skin assessment post-treatment (if applicable):    [x]  intact    []  redness- no adverse reaction                 []redness - adverse reaction:         Therapeutic Procedures:  Tx Min Billable or 1:1 Min (if diff from Tx Min) Procedure, Rationale, Specifics   28  73797 Therapeutic

## 2024-05-03 ENCOUNTER — HOSPITAL ENCOUNTER (OUTPATIENT)
Facility: HOSPITAL | Age: 64
Setting detail: RECURRING SERIES
Discharge: HOME | End: 2024-05-06
Payer: COMMERCIAL

## 2024-05-03 PROCEDURE — 97016 VASOPNEUMATIC DEVICE THERAPY: CPT

## 2024-05-03 PROCEDURE — 97530 THERAPEUTIC ACTIVITIES: CPT

## 2024-05-03 PROCEDURE — 97110 THERAPEUTIC EXERCISES: CPT

## 2024-05-03 PROCEDURE — 97112 NEUROMUSCULAR REEDUCATION: CPT

## 2024-05-03 NOTE — PROGRESS NOTES
Other detail:       Objective Information/Functional Measures/Assessment    Right knee AROM 10-95*     Pt tolerated session well with no rest break needed today with standing interventions. Able to complete STS from 23in without UE assist and more equal WB through LE today. Right knee ROM continues to slowly improve. Still using wedge in supine due to pt being unable to lay supine.     Patient will continue to benefit from skilled PT / OT services to modify and progress therapeutic interventions, analyze and address functional mobility deficits, analyze and address ROM deficits, analyze and address strength deficits, analyze and address soft tissue restrictions, analyze and cue for proper movement patterns, analyze and modify for postural abnormalities, analyze and address imbalance/dizziness, and instruct in home and community integration to address functional deficits and attain remaining goals.    Progress toward goals / Updated goals:  []  See Progress Note/Recertification    Short Term Goals: STG- To be accomplished in 8 visit(s):  1.  Pt will be independent with HEP to encourage prophylaxis.  Eval: HEP dispensed   Current: compliance per pt report goal MET 5/3/24     2. Pt right knee AROM will improve to 5 -90* to increase ease with transfers  Eval: 20-70*  Current: 10-95* progressing well 5/3/24     Long Term Goals: LTG- To be accomplished in 28 visit(s):  1.  Pt will report max pain 1/10 to complete ADLs with increased ease.   Eval: 10/10 max pain  Current: 8/10 max pain progressing 5/3/24     2.  Pt right LE strength will improve to at least 4+/5 to be able to manage stairs in home with increased ease  Eval:        Right   Hip Flexion 3+   Knee Flexion 2     Extension 2      3.  Pt right knee AROM will improve to 0-120 to allow pt to complete transfers with increased ease.  Eval:20-70*     4.  Pt FOTO score will improve to 64  to show improvement in subjective function.  Eval:24  *FOTO score is an

## 2024-05-06 ENCOUNTER — HOSPITAL ENCOUNTER (OUTPATIENT)
Facility: HOSPITAL | Age: 64
Setting detail: RECURRING SERIES
Discharge: HOME | End: 2024-05-09
Payer: COMMERCIAL

## 2024-05-06 PROCEDURE — 97112 NEUROMUSCULAR REEDUCATION: CPT

## 2024-05-06 PROCEDURE — 97110 THERAPEUTIC EXERCISES: CPT

## 2024-05-06 PROCEDURE — 97016 VASOPNEUMATIC DEVICE THERAPY: CPT

## 2024-05-06 PROCEDURE — 97530 THERAPEUTIC ACTIVITIES: CPT

## 2024-05-06 NOTE — PROGRESS NOTES
PHYSICAL / OCCUPATIONAL THERAPY - DAILY TREATMENT NOTE     Patient Name: King Moran    Date: 2024    : 1960  Insurance: Payor: [employer] [facility] / Product Type: *No Product type* /      Patient  verified Yes     Visit #   Current / Total 9 28   Time   In / Out 1009 1120   Pain   In / Out 4 4   Subjective Functional Status/Changes: I was able to do my own laundry this weekend.    Changes to:  Allergies, Med Hx, Sx Hx?   no       TREATMENT AREA =  Right knee pain [M25.561]    OBJECTIVE    Modalities Rationale:     decrease edema, decrease inflammation, and decrease pain to improve patient's ability to progress to PLOF and address remaining functional goals.     min [] Estim Unattended, type/location:                                      []  w/ice    []  w/heat    min [] Estim Attended, type/location:                                     []  w/US     []  w/ice    []  w/heat    []  TENS insruct      min []  Mechanical Traction: type/lbs                   []  pro   []  sup   []  int   []  cont    []  before manual    []  after manual    min []  Ultrasound, settings/location:      min []  Iontophoresis w/ dexamethasone, location:                                               []  take home patch       []  in clinic        min  unbilled []  Ice     []  Heat    location/position:     min []  Paraffin,  details:    10 min [x]  Vasopneumatic Device, press/temp:     min []  Whirlpool / Fluido:    If using vaso (only need to measure limb vaso being performed on)      pre-treatment girth : 48.7 cm      post-treatment girth : 48.5 cm      measured at (landmark location) :  joint line     min []  Other:    Skin assessment post-treatment (if applicable):    [x]  intact    []  redness- no adverse reaction                 []redness - adverse reaction:         Therapeutic Procedures:  Tx Min Billable or 1:1 Min (if diff from Tx Min) Procedure, Rationale, Specifics   34  
score is an established functional score where 100 = no disability*     5. Pt will be able to walk with without AD with good gait mechanics to return to PLOF  Eval: RW with decreased right LE WB, decreased heel strike, knee flexion, right foot ER  PN: SPC with decreased paulino, left step length, and right lateral lean progressing  5/6/24    Summary of Care/ Key Functional Changes: Pt presented to therapy with c/c right knee pain s/p right TKR on 4/3/24. Pt has attended 9 sessions including initial eval with demonstrating improvements in right knee ROM reaching 10 - 98* today, using SPC versus RW, and improving LE strength. Pt unable to complete SLS on right and continues to fatigue easily with interventions due to decreased LE strength. Although knee ROM is improving still not WFL and pt reporting difficulty with getting on riding  and into tub. Pt would benefit from continued skilled PT services to address remaining unmet goals and above deficits to allow pt to complete ADLs with increased ease and less pain.       ASSESSMENT/RECOMMENDATIONS:    Continue per plan of care.       Thank you for this referral.   Josefina Diggs, PT 5/6/2024 7:33 AM

## 2024-05-08 ENCOUNTER — HOSPITAL ENCOUNTER (OUTPATIENT)
Facility: HOSPITAL | Age: 64
Setting detail: RECURRING SERIES
Discharge: HOME | End: 2024-05-11
Payer: COMMERCIAL

## 2024-05-08 PROCEDURE — 97016 VASOPNEUMATIC DEVICE THERAPY: CPT

## 2024-05-08 PROCEDURE — 97110 THERAPEUTIC EXERCISES: CPT

## 2024-05-08 PROCEDURE — 97530 THERAPEUTIC ACTIVITIES: CPT

## 2024-05-08 PROCEDURE — 97140 MANUAL THERAPY 1/> REGIONS: CPT

## 2024-05-08 NOTE — PROGRESS NOTES
PHYSICAL / OCCUPATIONAL THERAPY - DAILY TREATMENT NOTE    Patient Name: King Moran    Date: 2024    : 1960  Insurance: Payor: [employer] [facility] / Product Type: *No Product type* /      Patient  verified Yes     Visit #   Current / Total 10 28   Time   In / Out 1214 105   Pain   In / Out 4 6   Subjective Functional Status/Changes: Pt reported continued difficulty sleeping and soreness lateral right knee upon arrival     TREATMENT AREA =  Right knee pain [M25.561]    OBJECTIVE    Modalities Rationale:     decrease inflammation and decrease pain to improve patient's ability to progress to PLOF and address remaining functional goals.     min [] Estim Unattended, type/location:                                      []  w/ice    []  w/heat    min [] Estim Attended, type/location:                                     []  w/US     []  w/ice    []  w/heat    []  TENS insruct      min []  Mechanical Traction: type/lbs                   []  pro   []  sup   []  int   []  cont    []  before manual    []  after manual    min []  Ultrasound, settings/location:      min  unbill []  Ice     []  Heat    location/position:     min []  Paraffin,  details:    10 min [x]  Vasopneumatic Device, press/temp:     min []  Whirlpool / Fluido:    If using vaso (only need to measure limb vaso being performed on)      pre-treatment girth : 49.5 cm      post-treatment girth : 49cm      measured at (landmark location) :      min []  Other:    Skin assessment post-treatment:   Intact      Therapeutic Procedures:    Tx Min Billable or 1:1 Min (if diff from Tx Min) Procedure, Rationale, Specifics   15  62459 Therapeutic Exercise (timed):  increase ROM, strength, coordination, balance, and proprioception to improve patient's ability to progress to PLOF and address remaining functional goals. (see flow sheet as applicable)     Details if applicable:       66 29003 Manual Therapy (timed):

## 2024-05-10 ENCOUNTER — TELEPHONE (OUTPATIENT)
Facility: HOSPITAL | Age: 64
End: 2024-05-10

## 2024-05-10 ENCOUNTER — APPOINTMENT (OUTPATIENT)
Facility: HOSPITAL | Age: 64
End: 2024-05-10
Payer: COMMERCIAL

## 2024-05-15 ENCOUNTER — HOSPITAL ENCOUNTER (OUTPATIENT)
Facility: HOSPITAL | Age: 64
Setting detail: RECURRING SERIES
Discharge: HOME | End: 2024-05-18
Payer: COMMERCIAL

## 2024-05-15 PROCEDURE — 97530 THERAPEUTIC ACTIVITIES: CPT

## 2024-05-15 PROCEDURE — 97112 NEUROMUSCULAR REEDUCATION: CPT

## 2024-05-15 PROCEDURE — 97140 MANUAL THERAPY 1/> REGIONS: CPT

## 2024-05-15 PROCEDURE — 97016 VASOPNEUMATIC DEVICE THERAPY: CPT

## 2024-05-15 PROCEDURE — 97110 THERAPEUTIC EXERCISES: CPT

## 2024-05-15 NOTE — PROGRESS NOTES
length, and right lateral lean progressing  5/6/24    PLAN  Yes  Continue plan of care  []  Upgrade activities as tolerated  []  Discharge due to :  []  Other:    Rena Webber PT    5/15/2024    9:20 AM    Future Appointments   Date Time Provider Department Center   5/15/2024 10:10 AM Rena Webber PT [facility]   5/17/2024 10:10 AM Josefina Callahan PTA [facility]   5/22/2024  9:50 AM Josefina Diggs PT [facility]   5/24/2024  9:50 AM Josefina Diggs PT [facility]   5/29/2024  9:50 AM Rena Webber PT [facility]   5/31/2024  9:50 AM [facility]

## 2024-05-17 ENCOUNTER — HOSPITAL ENCOUNTER (OUTPATIENT)
Facility: HOSPITAL | Age: 64
Setting detail: RECURRING SERIES
Discharge: HOME | End: 2024-05-20
Payer: COMMERCIAL

## 2024-05-17 PROCEDURE — 97110 THERAPEUTIC EXERCISES: CPT

## 2024-05-17 PROCEDURE — 97112 NEUROMUSCULAR REEDUCATION: CPT

## 2024-05-17 PROCEDURE — 97530 THERAPEUTIC ACTIVITIES: CPT

## 2024-05-17 PROCEDURE — 97016 VASOPNEUMATIC DEVICE THERAPY: CPT

## 2024-05-17 NOTE — PROGRESS NOTES
PHYSICAL / OCCUPATIONAL THERAPY - DAILY TREATMENT NOTE     Patient Name: King Moran    Date: 2024    : 1960  Insurance: Payor: [employer] [facility] / Product Type: *No Product type* /      Patient  verified Yes     Visit #   Current / Total 12 28   Time   In / Out 1010 1115   Pain   In / Out 6 4   Subjective Functional Status/Changes: Still hurting and sore. I can't wait to be able to drive myself. I just can;t get in my car.    Changes to:  Allergies, Med Hx, Sx Hx?   no       TREATMENT AREA =  Right knee pain [M25.561]    OBJECTIVE    Modalities Rationale:     decrease edema, decrease inflammation, and decrease pain to improve patient's ability to progress to PLOF and address remaining functional goals.     min [] Estim Unattended, type/location:                                      []  w/ice    []  w/heat    min [] Estim Attended, type/location:                                     []  w/US     []  w/ice    []  w/heat    []  TENS insruct      min []  Mechanical Traction: type/lbs                   []  pro   []  sup   []  int   []  cont    []  before manual    []  after manual    min []  Ultrasound, settings/location:      min []  Iontophoresis w/ dexamethasone, location:                                               []  take home patch       []  in clinic        min  unbilled []  Ice     []  Heat    location/position:     min []  Paraffin,  details:    10 min [x]  Vasopneumatic Device, press/temp:     min []  Whirlpool / Fluido:    If using vaso (only need to measure limb vaso being performed on)      pre-treatment girth : 50.5cm      post-treatment girth : 50cm      measured at (landmark location) :  joint line     min []  Other:    Skin assessment post-treatment (if applicable):    [x]  intact    []  redness- no adverse reaction                 []redness - adverse reaction:         Therapeutic Procedures:  Tx Min Billable or 1:1 Min (if diff from

## 2024-05-20 ENCOUNTER — TELEPHONE (OUTPATIENT)
Facility: HOSPITAL | Age: 64
End: 2024-05-20

## 2024-05-20 ENCOUNTER — APPOINTMENT (OUTPATIENT)
Facility: HOSPITAL | Age: 64
End: 2024-05-20
Payer: COMMERCIAL

## 2024-05-20 NOTE — TELEPHONE ENCOUNTER
Pt. called to [facility] due to being under the weather. Confirmed Wednesday appt for now. Declined to r/s, plans to do si when he comes in on Wednesday.

## 2024-05-21 ENCOUNTER — TELEPHONE (OUTPATIENT)
Facility: HOSPITAL | Age: 64
End: 2024-05-21

## 2024-05-21 NOTE — TELEPHONE ENCOUNTER
Pt. called to [facility] due to being sick. Pt. has a fever. Declined to r/s at this time. Confirmed 5/24 appt.

## 2024-05-22 ENCOUNTER — APPOINTMENT (OUTPATIENT)
Facility: HOSPITAL | Age: 64
End: 2024-05-22
Payer: COMMERCIAL

## 2024-05-24 ENCOUNTER — HOSPITAL ENCOUNTER (OUTPATIENT)
Facility: HOSPITAL | Age: 64
Setting detail: RECURRING SERIES
End: 2024-05-24
Payer: COMMERCIAL

## 2024-05-24 ENCOUNTER — TELEPHONE (OUTPATIENT)
Facility: HOSPITAL | Age: 64
End: 2024-05-24

## 2024-05-24 NOTE — TELEPHONE ENCOUNTER
Pt.'s daughter called due to still having a low grade fever. Still on antibiotics and hoping to be better by his next appt. on Wednesday 5/29. WCB with updates on Monday.

## 2024-05-29 ENCOUNTER — HOSPITAL ENCOUNTER (OUTPATIENT)
Facility: HOSPITAL | Age: 64
Setting detail: RECURRING SERIES
Discharge: HOME | End: 2024-06-01
Payer: COMMERCIAL

## 2024-05-29 PROCEDURE — 97110 THERAPEUTIC EXERCISES: CPT

## 2024-05-29 PROCEDURE — 97016 VASOPNEUMATIC DEVICE THERAPY: CPT

## 2024-05-29 PROCEDURE — 97530 THERAPEUTIC ACTIVITIES: CPT

## 2024-05-29 PROCEDURE — 97112 NEUROMUSCULAR REEDUCATION: CPT

## 2024-05-29 NOTE — PROGRESS NOTES
services to modify and progress therapeutic interventions, analyze and address functional mobility deficits, analyze and address ROM deficits, analyze and address strength deficits, and analyze and address soft tissue restrictions to address functional deficits and attain remaining goals.    Progress toward goals / Updated goals:  []  See Progress Note/Recertification    Short Term Goals: STG- To be accomplished in 8 visit(s):  1.  Pt will be independent with HEP to encourage prophylaxis.  Eval: HEP dispensed   PN: compliance per pt report goal MET 5/6/24     2. Pt right knee AROM will improve to 5 -90* to increase ease with transfers  Eval: 20-70*  PN: 10-98* progressing well    Current: 13-91*regression 5/29/24     Long Term Goals: LTG- To be accomplished in 28 visit(s):  1.  Pt will report max pain 1/10 to complete ADLs with increased ease.   Eval: 10/10 max pain  PN: 6/10 max pain progressing 5/6/24     2.  Pt right LE strength will improve to at least 4+/5 to be able to manage stairs in home with increased ease  Eval:        Right   Hip Flexion 3+   Knee Flexion 2     Extension 2      PN: progressing 5/6/24        Right   Hip Flexion 3+   Knee Flexion 4     Extension 4      3.  Pt right knee AROM will improve to 0-120 to allow pt to complete transfers with increased ease.  Eval:20-70*  PN: 10-98* progressing 5/6/24     4.  Pt FOTO score will improve to 64  to show improvement in subjective function.  Eval:24  PN: 33 progressing  5/6/24  *FOTO score is an established functional score where 100 = no disability*     5. Pt will be able to walk with without AD with good gait mechanics to return to PLOF  Eval: RW with decreased right LE WB, decreased heel strike, knee flexion, right foot ER  PN: SPC with decreased paulino, left step length, and right lateral lean progressing  5/6/24    PLAN  Yes  Continue plan of care  []  Upgrade activities as tolerated  []  Discharge due to :  []  Other:    Rena Webber, PT

## 2024-05-31 ENCOUNTER — HOSPITAL ENCOUNTER (OUTPATIENT)
Facility: HOSPITAL | Age: 64
Setting detail: RECURRING SERIES
End: 2024-05-31
Payer: COMMERCIAL

## 2024-05-31 PROCEDURE — 97112 NEUROMUSCULAR REEDUCATION: CPT

## 2024-05-31 PROCEDURE — 97530 THERAPEUTIC ACTIVITIES: CPT

## 2024-05-31 PROCEDURE — 97110 THERAPEUTIC EXERCISES: CPT

## 2024-05-31 PROCEDURE — 97016 VASOPNEUMATIC DEVICE THERAPY: CPT

## 2024-05-31 NOTE — PROGRESS NOTES
PHYSICAL / OCCUPATIONAL THERAPY - DAILY TREATMENT NOTE     Patient Name: King Moran    Date: 2024    : 1960  Insurance: Payor: [facility] / [facility] / Product Type: *No Product type* /      Patient  verified Yes     Visit #   Current / Total 14 28   Time   In / Out 9:50 10:50   Pain   In / Out 4 4-5   Subjective Functional Status/Changes: \"I was able to drive for the first time in two months.\"   Changes to:  Allergies, Med Hx, Sx Hx?   no       TREATMENT AREA =  Right knee pain [M25.561]    OBJECTIVE    Modalities Rationale:     decrease edema, decrease inflammation, and decrease pain to improve patient's ability to progress to PLOF and address remaining functional goals.     min [] Estim Unattended, type/location:                                      []  w/ice    []  w/heat    min [] Estim Attended, type/location:                                     []  w/US     []  w/ice    []  w/heat    []  TENS insruct      min []  Mechanical Traction: type/lbs                   []  pro   []  sup   []  int   []  cont    []  before manual    []  after manual    min []  Ultrasound, settings/location:      min []  Iontophoresis w/ dexamethasone, location:                                               []  take home patch       []  in clinic        min  unbilled []  Ice     []  Heat    location/position:     min []  Paraffin,  details:    10 min [x]  Vasopneumatic Device, press/temp:     min []  Whirlpool / Fluido:    If using vaso (only need to measure limb vaso being performed on)      pre-treatment girth : 46 cm      post-treatment girth : cm      measured at (landmark location) :  Mid patella    min []  Other:    Skin assessment post-treatment (if applicable):    [x]  intact    []  redness- no adverse reaction                 []redness - adverse reaction:         Therapeutic Procedures:  Tx Min Billable or 1:1 Min (if diff from Tx Min) Procedure, Rationale, Specifics

## 2024-06-03 ENCOUNTER — HOSPITAL ENCOUNTER (OUTPATIENT)
Facility: HOSPITAL | Age: 64
Setting detail: RECURRING SERIES
Discharge: HOME | End: 2024-06-06
Payer: COMMERCIAL

## 2024-06-03 PROCEDURE — 97110 THERAPEUTIC EXERCISES: CPT

## 2024-06-03 PROCEDURE — 97535 SELF CARE MNGMENT TRAINING: CPT

## 2024-06-03 PROCEDURE — 97112 NEUROMUSCULAR REEDUCATION: CPT

## 2024-06-03 PROCEDURE — 97530 THERAPEUTIC ACTIVITIES: CPT

## 2024-06-03 NOTE — PROGRESS NOTES
[facility] [place]  [address]  [place]  [address]  Phone: [phone]      Fax:  [phone]    Progress Note  Patient name: King Moran Start of Care: 24   Referral source: Ousmane Brooks* : 1960    Medical Diagnosis: Right knee pain [M25.561]  Payor: [employer] [facility] / Plan: [facility] [employer] [facility] / Product Type: *No Product type* /  Onset Date:DOS 4/3/24    Treatment Diagnosis:  M25.561  RIGHT KNEE PAIN     Prior Hospitalization: see medical history Provider#: [id number]   Medications: Verified on Patient summary List   Comorbidities:  left TKR 2019, infection after, HTN, appendectomy   Prior Level of Function: walking with SPC, able to complete ADLs independently    Visits from Start of Care: 15    Missed Visits: 3    Goals/Measure of Progress: To be achieved in 4 weeks:    Short Term Goals: STG- To be accomplished in 8 visit(s):  1.  Pt will be independent with HEP to encourage prophylaxis.  Eval: HEP dispensed   PN: compliance per pt report goal MET 24     2. Pt right knee AROM will improve to 5 -90* to increase ease with transfers  Eval: 20-70*  PN: 10- 101* AROM progressing well 6/3/24     Long Term Goals: LTG- To be accomplished in 28 visit(s):  1.  Pt will report max pain 1/10 to complete ADLs with increased ease.   Eval: 10/10 max pain  PN: 6/10 max pain 6/3/24     2.  Pt right LE strength will improve to at least 4+/5 to be able to manage stairs in home with increased ease  Eval:        Right   Hip Flexion 3+   Knee Flexion 2     Extension 2      PN: progressing 6/3/24        Right   Hip Flexion 3+   Knee Flexion 4     Extension 4+      3.  Pt right knee AROM will improve to 0-120 to allow pt to complete transfers with increased ease.  Eval:20-70*  PN: * AROM progressing 6/3/24     4.  Pt FOTO score will improve to 64  to show improvement in subjective function.  Eval:24  PN: 47 progressing 6/3/24  *FOTO

## 2024-06-03 NOTE — PROGRESS NOTES
PHYSICAL / OCCUPATIONAL THERAPY - DAILY TREATMENT NOTE     Patient Name: King Moran    Date: 6/3/2024    : 1960  Insurance: Payor: [facility] [employer] / Product Type: *No Product type* /      Patient  verified Yes     Visit #   Current / Total 15 28   Time   In / Out 957 1107   Pain   In / Out 4 5   Subjective Functional Status/Changes: Still using the cane as a just in case   Changes to:  Allergies, Med Hx, Sx Hx?   no       TREATMENT AREA =  Right knee pain [M25.561]    OBJECTIVE    Modalities Rationale:     decrease edema, decrease inflammation, and decrease pain to improve patient's ability to progress to PLOF and address remaining functional goals.     min [] Estim Unattended, type/location:                                      []  w/ice    []  w/heat    min [] Estim Attended, type/location:                                     []  w/US     []  w/ice    []  w/heat    []  TENS insruct      min []  Mechanical Traction: type/lbs                   []  pro   []  sup   []  int   []  cont    []  before manual    []  after manual    min []  Ultrasound, settings/location:      min []  Iontophoresis w/ dexamethasone, location:                                               []  take home patch       []  in clinic        min  unbilled []  Ice     []  Heat    location/position:     min []  Paraffin,  details:    10 min [x]  Vasopneumatic Device, press/temp:     min []  Whirlpool / Fluido:    If using vaso (only need to measure limb vaso being performed on)      pre-treatment girth : 50cm      post-treatment girth : 49.6cm      measured at (landmark location) :  joint line     min []  Other:    Skin assessment post-treatment (if applicable):    [x]  intact    []  redness- no adverse reaction                 []redness - adverse reaction:         Therapeutic Procedures:  Tx Min Billable or 1:1 Min (if diff from Tx Min) Procedure, Rationale, Specifics   [id number]

## 2024-06-04 ENCOUNTER — TELEPHONE (OUTPATIENT)
Facility: HOSPITAL | Age: 64
End: 2024-06-04

## 2024-06-04 NOTE — TELEPHONE ENCOUNTER
Called to inform pt. that we have to cxl 6/5 appt. due to authorization pending.   
Previous Infection Text: The patient has recovered from a previous COVID-19 infection.
Detail Level: Simple
Has The Patient Been Infected With Covid-19 In The Past?: No

## 2024-06-05 ENCOUNTER — APPOINTMENT (OUTPATIENT)
Facility: HOSPITAL | Age: 64
End: 2024-06-05
Payer: COMMERCIAL

## 2024-06-06 ENCOUNTER — HOSPITAL ENCOUNTER (OUTPATIENT)
Facility: HOSPITAL | Age: 64
Setting detail: RECURRING SERIES
Discharge: HOME | End: 2024-06-09
Payer: COMMERCIAL

## 2024-06-06 PROCEDURE — 97112 NEUROMUSCULAR REEDUCATION: CPT

## 2024-06-06 PROCEDURE — 97110 THERAPEUTIC EXERCISES: CPT

## 2024-06-06 PROCEDURE — 97530 THERAPEUTIC ACTIVITIES: CPT

## 2024-06-06 PROCEDURE — 97016 VASOPNEUMATIC DEVICE THERAPY: CPT

## 2024-06-06 NOTE — PROGRESS NOTES
Information/Functional Measures/Assessment    Right knee AROM 8-105*     Pt tolerated session well with reporting soreness post session. Pain still most limiting factor with knee ROM however improved since last visit. Quad and hip flexor strength deficits noted with challenged with lifting right LE on/off table.     Patient will continue to benefit from skilled PT / OT services to modify and progress therapeutic interventions, analyze and address functional mobility deficits, analyze and address ROM deficits, analyze and address strength deficits, analyze and address soft tissue restrictions, analyze and cue for proper movement patterns, analyze and modify for postural abnormalities, analyze and address imbalance/dizziness, and instruct in home and community integration to address functional deficits and attain remaining goals.    Progress toward goals / Updated goals:  []  See Progress Note/Recertification    Short Term Goals: STG- To be accomplished in 8 visit(s):  1.  Pt will be independent with HEP to encourage prophylaxis.  Eval: HEP dispensed   PN: compliance per pt report goal MET 5/6/24     2. Pt right knee AROM will improve to 5 -90* to increase ease with transfers  Eval: 20-70*  PN: 10- 101* AROM progressing well 6/3/24  Current: 8-105* progressing 6/6/24     Long Term Goals: LTG- To be accomplished in 28 visit(s):  1.  Pt will report max pain 1/10 to complete ADLs with increased ease.   Eval: 10/10 max pain  PN: 6/10 max pain 6/3/24     2.  Pt right LE strength will improve to at least 4+/5 to be able to manage stairs in home with increased ease  Eval:        Right   Hip Flexion 3+   Knee Flexion 2     Extension 2      PN: progressing 6/3/24        Right   Hip Flexion 3+   Knee Flexion 4     Extension 4+      3.  Pt right knee AROM will improve to 0-120 to allow pt to complete transfers with increased ease.  Eval:20-70*  PN: * AROM progressing 6/3/24     4.  Pt FOTO score will improve to 64  to show

## 2024-06-07 ENCOUNTER — HOSPITAL ENCOUNTER (OUTPATIENT)
Facility: HOSPITAL | Age: 64
Setting detail: RECURRING SERIES
Discharge: HOME | End: 2024-06-10
Payer: COMMERCIAL

## 2024-06-07 PROCEDURE — 97110 THERAPEUTIC EXERCISES: CPT

## 2024-06-07 PROCEDURE — 97016 VASOPNEUMATIC DEVICE THERAPY: CPT

## 2024-06-07 PROCEDURE — 97530 THERAPEUTIC ACTIVITIES: CPT

## 2024-06-07 PROCEDURE — 97112 NEUROMUSCULAR REEDUCATION: CPT

## 2024-06-07 NOTE — PROGRESS NOTES
PHYSICAL / OCCUPATIONAL THERAPY - DAILY TREATMENT NOTE     Patient Name: King Moran    Date: 2024    : 1960  Insurance: Payor: [employer] [facility] / [facility] / Product Type: *No Product type* /      Patient  verified Yes     Visit #   Current / Total 17 28   Time   In / Out 930 1030   Pain   In / Out 4 4   Subjective Functional Status/Changes: Sore.    Changes to:  Allergies, Med Hx, Sx Hx?   no       TREATMENT AREA =  Right knee pain [M25.561]    OBJECTIVE    Modalities Rationale:     decrease edema, decrease inflammation, and decrease pain to improve patient's ability to progress to PLOF and address remaining functional goals.     min [] Estim Unattended, type/location:                                      []  w/ice    []  w/heat    min [] Estim Attended, type/location:                                     []  w/US     []  w/ice    []  w/heat    []  TENS insruct      min []  Mechanical Traction: type/lbs                   []  pro   []  sup   []  int   []  cont    []  before manual    []  after manual    min []  Ultrasound, settings/location:      min []  Iontophoresis w/ dexamethasone, location:                                               []  take home patch       []  in clinic        min  unbilled []  Ice     []  Heat    location/position:     min []  Paraffin,  details:    10 min [x]  Vasopneumatic Device, press/temp:     min []  Whirlpool / Fluido:    If using vaso (only need to measure limb vaso being performed on)      pre-treatment girth : 50cm      post-treatment girth : 49.8cm      measured at (landmark location) : joint line      min []  Other:    Skin assessment post-treatment (if applicable):    [x]  intact    []  redness- no adverse reaction                 []redness - adverse reaction:         Therapeutic Procedures:  Tx Min Billable or 1:1 Min (if diff from Tx Min) Procedure, Rationale, Specifics   20  [id number] Therapeutic Exercise (timed):

## 2024-06-10 ENCOUNTER — HOSPITAL ENCOUNTER (OUTPATIENT)
Facility: HOSPITAL | Age: 64
Setting detail: RECURRING SERIES
Discharge: HOME | End: 2024-06-13
Payer: COMMERCIAL

## 2024-06-10 ENCOUNTER — TELEPHONE (OUTPATIENT)
Facility: HOSPITAL | Age: 64
End: 2024-06-10

## 2024-06-10 PROCEDURE — 97530 THERAPEUTIC ACTIVITIES: CPT

## 2024-06-10 PROCEDURE — 97016 VASOPNEUMATIC DEVICE THERAPY: CPT

## 2024-06-10 PROCEDURE — 97110 THERAPEUTIC EXERCISES: CPT

## 2024-06-10 PROCEDURE — 97112 NEUROMUSCULAR REEDUCATION: CPT

## 2024-06-10 NOTE — PROGRESS NOTES
PHYSICAL / OCCUPATIONAL THERAPY - DAILY TREATMENT NOTE     Patient Name: King Moran    Date: 6/10/2024    : 1960  Insurance: Payor: [employer] [facility] / [facility] / Product Type: *No Product type* /      Patient  verified Yes     Visit #   Current / Total 18 28   Time   In / Out 945 1040   Pain   In / Out 3-4 3-4   Subjective Functional Status/Changes: Feel like my leg is a little more swollen today    Changes to:  Allergies, Med Hx, Sx Hx?   no       TREATMENT AREA =  Right knee pain [M25.561]    OBJECTIVE    Modalities Rationale:     decrease edema, decrease inflammation, and decrease pain to improve patient's ability to progress to PLOF and address remaining functional goals.     min [] Estim Unattended, type/location:                                      []  w/ice    []  w/heat    min [] Estim Attended, type/location:                                     []  w/US     []  w/ice    []  w/heat    []  TENS insruct      min []  Mechanical Traction: type/lbs                   []  pro   []  sup   []  int   []  cont    []  before manual    []  after manual    min []  Ultrasound, settings/location:      min []  Iontophoresis w/ dexamethasone, location:                                               []  take home patch       []  in clinic        min  unbilled []  Ice     []  Heat    location/position:     min []  Paraffin,  details:    10 min [x]  Vasopneumatic Device, press/temp:     min []  Whirlpool / Fluido:    If using vaso (only need to measure limb vaso being performed on)      pre-treatment girth : 50cm      post-treatment girth : 49.6cm      measured at (landmark location) :  joint line     min []  Other:    Skin assessment post-treatment (if applicable):    [x]  intact    []  redness- no adverse reaction                 []redness - adverse reaction:         Therapeutic Procedures:  Tx Min Billable or 1:1 Min (if diff from Tx Min) Procedure, Rationale, Specifics   5

## 2024-06-12 ENCOUNTER — HOSPITAL ENCOUNTER (OUTPATIENT)
Facility: HOSPITAL | Age: 64
Setting detail: RECURRING SERIES
End: 2024-06-12
Payer: COMMERCIAL

## 2024-06-12 ENCOUNTER — TELEPHONE (OUTPATIENT)
Facility: HOSPITAL | Age: 64
End: 2024-06-12

## 2024-06-13 ENCOUNTER — TELEPHONE (OUTPATIENT)
Facility: HOSPITAL | Age: 64
End: 2024-06-13

## 2024-06-14 ENCOUNTER — HOSPITAL ENCOUNTER (OUTPATIENT)
Facility: HOSPITAL | Age: 64
Setting detail: RECURRING SERIES
Discharge: HOME | End: 2024-06-17
Payer: COMMERCIAL

## 2024-06-14 PROCEDURE — 97110 THERAPEUTIC EXERCISES: CPT

## 2024-06-14 PROCEDURE — 97016 VASOPNEUMATIC DEVICE THERAPY: CPT

## 2024-06-14 PROCEDURE — 97112 NEUROMUSCULAR REEDUCATION: CPT

## 2024-06-14 PROCEDURE — 97530 THERAPEUTIC ACTIVITIES: CPT

## 2024-06-14 NOTE — PROGRESS NOTES
tolerated session well with reporting no increase in pain post modality. Improved right knee extension AROM noted today post manual with reaching -5*. Pain continues to be limiting factor with exercise progression.     Patient will continue to benefit from skilled PT / OT services to modify and progress therapeutic interventions, analyze and address functional mobility deficits, analyze and address ROM deficits, analyze and address strength deficits, analyze and cue for proper movement patterns, analyze and modify for postural abnormalities, analyze and address imbalance/dizziness, and instruct in home and community integration to address functional deficits and attain remaining goals.    Progress toward goals / Updated goals:  []  See Progress Note/Recertification    Short Term Goals: STG- To be accomplished in 8 visit(s):  1.  Pt will be independent with HEP to encourage prophylaxis.  Eval: HEP dispensed   PN: compliance per pt report goal MET 5/6/24     2. Pt right knee AROM will improve to 5 -90* to increase ease with transfers  Eval: 20-70*  PN: 10- 101* AROM progressing well 6/3/24  Current:5-101* goal MET 6/14/24     Long Term Goals: LTG- To be accomplished in 28 visit(s):  1.  Pt will report max pain 1/10 to complete ADLs with increased ease.   Eval: 10/10 max pain  PN: 6/10 max pain 6/3/24     2.  Pt right LE strength will improve to at least 4+/5 to be able to manage stairs in home with increased ease  Eval:        Right   Hip Flexion 3+   Knee Flexion 2     Extension 2      PN: progressing 6/3/24        Right   Hip Flexion 3+   Knee Flexion 4     Extension 4+      Current: fatigue with standing marches progressing 6/10/24     3.  Pt right knee AROM will improve to 0-120 to allow pt to complete transfers with increased ease.  Eval:20-70*  PN: * AROM progressing 6/3/24  Current: -5* progressing 6/14/24     4.  Pt FOTO score will improve to 64  to show improvement in subjective function.  Eval:24  PN:

## 2024-06-17 ENCOUNTER — HOSPITAL ENCOUNTER (OUTPATIENT)
Facility: HOSPITAL | Age: 64
Setting detail: RECURRING SERIES
Discharge: HOME | End: 2024-06-20
Payer: COMMERCIAL

## 2024-06-17 PROCEDURE — 97112 NEUROMUSCULAR REEDUCATION: CPT

## 2024-06-17 PROCEDURE — 97016 VASOPNEUMATIC DEVICE THERAPY: CPT

## 2024-06-17 PROCEDURE — 97110 THERAPEUTIC EXERCISES: CPT

## 2024-06-17 PROCEDURE — 97530 THERAPEUTIC ACTIVITIES: CPT

## 2024-06-17 NOTE — PROGRESS NOTES
PHYSICAL / OCCUPATIONAL THERAPY - DAILY TREATMENT NOTE     Patient Name: King Moran    Date: 2024    : 1960  Insurance: Payor: [employer] [facility] / Product Type: *No Product type* /      Patient  verified Yes     Visit #   Current / Total 20 28   Time   In / Out 9:50 10:44   Pain   In / Out 3-4 3-4   Subjective Functional Status/Changes: It's been a little less sore than usual   Changes to:  Allergies, Med Hx, Sx Hx?   no       TREATMENT AREA =  Right knee pain [M25.561]    OBJECTIVE    Modalities Rationale:     decrease edema, decrease inflammation, and decrease pain to improve patient's ability to progress to PLOF and address remaining functional goals.     min [] Estim Unattended, type/location:                                      []  w/ice    []  w/heat    min [] Estim Attended, type/location:                                     []  w/US     []  w/ice    []  w/heat    []  TENS insruct      min []  Mechanical Traction: type/lbs                   []  pro   []  sup   []  int   []  cont    []  before manual    []  after manual    min []  Ultrasound, settings/location:      min []  Iontophoresis w/ dexamethasone, location:                                               []  take home patch       []  in clinic        min  unbilled []  Ice     []  Heat    location/position:     min []  Paraffin,  details:    10 min [x]  Vasopneumatic Device, press/temp: Low/Low    min []  Whirlpool / Fluido:    If using vaso (only need to measure limb vaso being performed on)      pre-treatment girth : 50 cm      post-treatment girth : 49.4 cm      measured at (landmark location) :  Mid-Patella    min []  Other:    Skin assessment post-treatment (if applicable):    [x]  intact    []  redness- no adverse reaction                 []redness - adverse reaction:         Therapeutic Procedures:  Tx Min Billable or 1:1 Min (if diff from Tx Min) Procedure, Rationale, Specifics

## 2024-06-19 ENCOUNTER — HOSPITAL ENCOUNTER (OUTPATIENT)
Facility: HOSPITAL | Age: 64
Setting detail: RECURRING SERIES
Discharge: HOME | End: 2024-06-22
Payer: COMMERCIAL

## 2024-06-19 PROCEDURE — 97112 NEUROMUSCULAR REEDUCATION: CPT

## 2024-06-19 PROCEDURE — 97110 THERAPEUTIC EXERCISES: CPT

## 2024-06-19 PROCEDURE — 97016 VASOPNEUMATIC DEVICE THERAPY: CPT

## 2024-06-19 PROCEDURE — 97530 THERAPEUTIC ACTIVITIES: CPT

## 2024-06-19 NOTE — PROGRESS NOTES
AROM 6 - 105* post manual    Pt tolerated session well with reporting soreness post session. Pain continues to limit end range ROM in knee. Very fatigued with wall squats and heel downs due to decreased quad strength. Pt reporting able to go up stairs easily now however still fearful with descending stairs.     Patient will continue to benefit from skilled PT / OT services to modify and progress therapeutic interventions, analyze and address functional mobility deficits, analyze and address ROM deficits, analyze and address strength deficits, analyze and address soft tissue restrictions, analyze and cue for proper movement patterns, analyze and modify for postural abnormalities, analyze and address imbalance/dizziness, and instruct in home and community integration to address functional deficits and attain remaining goals.    Progress toward goals / Updated goals:  []  See Progress Note/Recertification    Short Term Goals: STG- To be accomplished in 8 visit(s):  1.  Pt will be independent with HEP to encourage prophylaxis.  Eval: HEP dispensed   PN: compliance per pt report goal MET 5/6/24     2. Pt right knee AROM will improve to 5 -90* to increase ease with transfers  Eval: 20-70*  PN: 10- 101* AROM progressing well 6/3/24  Current:5-101* goal MET 6/14/24     Long Term Goals: LTG- To be accomplished in 28 visit(s):  1.  Pt will report max pain 1/10 to complete ADLs with increased ease.   Eval: 10/10 max pain  PN: 6/10 max pain 6/3/24  Current: Pt reports 5/10 max pain 6/17/24     2.  Pt right LE strength will improve to at least 4+/5 to be able to manage stairs in home with increased ease  Eval:        Right   Hip Flexion 3+   Knee Flexion 2     Extension 2      PN: progressing 6/3/24        Right   Hip Flexion 3+   Knee Flexion 4     Extension 4+      Current: fatigue with standing marches progressing 6/10/24     3.  Pt right knee AROM will improve to 0-120 to allow pt to complete transfers with increased

## 2024-06-21 ENCOUNTER — HOSPITAL ENCOUNTER (OUTPATIENT)
Facility: HOSPITAL | Age: 64
Setting detail: RECURRING SERIES
Discharge: HOME | End: 2024-06-24
Payer: COMMERCIAL

## 2024-06-21 PROCEDURE — 97530 THERAPEUTIC ACTIVITIES: CPT

## 2024-06-21 PROCEDURE — 97112 NEUROMUSCULAR REEDUCATION: CPT

## 2024-06-21 PROCEDURE — 97110 THERAPEUTIC EXERCISES: CPT

## 2024-06-21 PROCEDURE — 97016 VASOPNEUMATIC DEVICE THERAPY: CPT

## 2024-06-21 NOTE — PROGRESS NOTES
PHYSICAL / OCCUPATIONAL THERAPY - DAILY TREATMENT NOTE     Patient Name: King Moran    Date: 2024    : 1960  Insurance: Payor: [employer] [facility] / [facility] / Product Type: *No Product type* /      Patient  verified Yes     Visit #   Current / Total 22 28   Time   In / Out 1031 1130   Pain   In / Out 4 3-4   Subjective Functional Status/Changes: Not too bad today but yesterday was rough.    Changes to:  Allergies, Med Hx, Sx Hx?   no       TREATMENT AREA =  Right knee pain [M25.561]    OBJECTIVE    Modalities Rationale:     decrease edema, decrease inflammation, and decrease pain to improve patient's ability to progress to PLOF and address remaining functional goals.     min [] Estim Unattended, type/location:                                      []  w/ice    []  w/heat    min [] Estim Attended, type/location:                                     []  w/US     []  w/ice    []  w/heat    []  TENS insruct      min []  Mechanical Traction: type/lbs                   []  pro   []  sup   []  int   []  cont    []  before manual    []  after manual    min []  Ultrasound, settings/location:      min []  Iontophoresis w/ dexamethasone, location:                                               []  take home patch       []  in clinic        min  unbilled []  Ice     []  Heat    location/position:     min []  Paraffin,  details:    10 min [x]  Vasopneumatic Device, press/temp:     min []  Whirlpool / Fluido:    If using vaso (only need to measure limb vaso being performed on)      pre-treatment girth : 50.5 cm      post-treatment girth : 50.4cm cm      measured at (landmark location) :  joint line     min []  Other:    Skin assessment post-treatment (if applicable):    [x]  intact    []  redness- no adverse reaction                 []redness - adverse reaction:         Therapeutic Procedures:  Tx Min Billable or 1:1 Min (if diff from Tx Min) Procedure, Rationale, Specifics

## 2024-06-24 ENCOUNTER — HOSPITAL ENCOUNTER (OUTPATIENT)
Facility: HOSPITAL | Age: 64
Setting detail: RECURRING SERIES
Discharge: HOME | End: 2024-06-27
Payer: COMMERCIAL

## 2024-06-24 ENCOUNTER — TELEPHONE (OUTPATIENT)
Facility: HOSPITAL | Age: 64
End: 2024-06-24

## 2024-06-24 PROCEDURE — 97530 THERAPEUTIC ACTIVITIES: CPT

## 2024-06-24 PROCEDURE — 97110 THERAPEUTIC EXERCISES: CPT

## 2024-06-24 PROCEDURE — 97112 NEUROMUSCULAR REEDUCATION: CPT

## 2024-06-24 PROCEDURE — 97016 VASOPNEUMATIC DEVICE THERAPY: CPT

## 2024-06-24 NOTE — PROGRESS NOTES
PHYSICAL / OCCUPATIONAL THERAPY - DAILY TREATMENT NOTE     Patient Name: King Moran    Date: 2024    : 1960  Insurance: Payor: [facility] [employer] / [facility] / Product Type: *No Product type* /      Patient  verified Yes     Visit #   Current / Total 23 28   Time   In / Out 10:31 AM 11:20 AM   Pain   In / Out 3 4   Subjective Functional Status/Changes: Patient reports he is doing well today and states he continues to go down stairs backwards at home due to safety   Changes to:  Allergies, Med Hx, Sx Hx?   no       TREATMENT AREA =  Right knee pain [M25.561]    OBJECTIVE    Modalities Rationale:     decrease edema, decrease inflammation, and decrease pain to improve patient's ability to progress to PLOF and address remaining functional goals.     min [] Estim Unattended, type/location:                                      []  w/ice    []  w/heat    min [] Estim Attended, type/location:                                     []  w/US     []  w/ice    []  w/heat    []  TENS insruct      min []  Mechanical Traction: type/lbs                   []  pro   []  sup   []  int   []  cont    []  before manual    []  after manual    min []  Ultrasound, settings/location:      min []  Iontophoresis w/ dexamethasone, location:                                               []  take home patch       []  in clinic        min  unbilled []  Ice     []  Heat    location/position:     min []  Paraffin,  details:    10 min [x]  Vasopneumatic Device, press/temp: Right knee, low/low    min []  Whirlpool / Fluido:    If using vaso (only need to measure limb vaso being performed on)      pre-treatment girth : 49.8 cm      post-treatment girth : 49.5 cm      measured at (landmark location) :  mid patella    min []  Other:    Skin assessment post-treatment (if applicable):    [x]  intact    []  redness- no adverse reaction                 []redness - adverse reaction:         Therapeutic

## 2024-06-26 ENCOUNTER — HOSPITAL ENCOUNTER (OUTPATIENT)
Facility: HOSPITAL | Age: 64
Setting detail: RECURRING SERIES
Discharge: HOME | End: 2024-06-29
Payer: COMMERCIAL

## 2024-06-26 PROCEDURE — 97112 NEUROMUSCULAR REEDUCATION: CPT

## 2024-06-26 PROCEDURE — 97530 THERAPEUTIC ACTIVITIES: CPT

## 2024-06-26 PROCEDURE — 97016 VASOPNEUMATIC DEVICE THERAPY: CPT

## 2024-06-26 PROCEDURE — 97110 THERAPEUTIC EXERCISES: CPT

## 2024-06-26 NOTE — PROGRESS NOTES
reaction:         Therapeutic Procedures:  Tx Min Billable or 1:1 Min (if diff from Tx Min) Procedure, Rationale, Specifics   18  94604 Therapeutic Exercise (timed):  increase ROM, strength, coordination, balance, and proprioception to improve patient's ability to progress to PLOF and address remaining functional goals. (see flow sheet as applicable)    Details if applicable:       12  96318 Therapeutic Activity (timed):  use of dynamic activities replicating functional movements to increase ROM, strength, coordination, balance, and proprioception in order to improve patient's ability to progress to PLOF and address remaining functional goals.  (see flow sheet as applicable)    Details if applicable:     10  27286 Neuromuscular Re-Education (timed):  improve balance, coordination, kinesthetic sense, posture, core stability and proprioception to improve patient's ability to develop conscious control of individual muscles and awareness of position of extremities in order to progress to PLOF and address remaining functional goals. (see flow sheet as applicable)     Details if applicable:           Details if applicable:            Details if applicable:     40  [facility] Totals Reminder: bill using total billable min of TIMED therapeutic procedures (example: do not include dry needle or estim unattended, both untimed codes, in totals to left)  8-22 min = 1 unit; 23-37 min = 2 units; 38-52 min = 3 units; 53-67 min = 4 units; 68-82 min = 5 units   Total Total     TOTAL TREATMENT TIME:        50     [x]  Patient Education billed concurrently with other procedures   [x] Review HEP    [] Progressed/Changed HEP, detail:    [] Other detail:       Objective Information/Functional Measures/Assessment    Patient tolerated treatment session well today. Progressed strengthening with increased resistance with single leg leg press and increased step distance for increased tension with step up with TKE..Patient was appropriately

## 2024-07-01 ENCOUNTER — HOSPITAL ENCOUNTER (OUTPATIENT)
Facility: HOSPITAL | Age: 64
Setting detail: RECURRING SERIES
Discharge: HOME | End: 2024-07-04
Payer: COMMERCIAL

## 2024-07-01 PROCEDURE — 97112 NEUROMUSCULAR REEDUCATION: CPT

## 2024-07-01 PROCEDURE — 97110 THERAPEUTIC EXERCISES: CPT

## 2024-07-01 PROCEDURE — 97016 VASOPNEUMATIC DEVICE THERAPY: CPT

## 2024-07-01 PROCEDURE — 97530 THERAPEUTIC ACTIVITIES: CPT

## 2024-07-01 NOTE — PROGRESS NOTES
PHYSICAL / OCCUPATIONAL THERAPY - DAILY TREATMENT NOTE     Patient Name: King Moran    Date: 2024    : 1960  Insurance: Payor: [facility] / [facility] / Product Type: *No Product type* /      Patient  verified Yes     Visit #   Current / Total 25 28   Time   In / Out 948 1105   Pain   In / Out 3 3   Subjective Functional Status/Changes: Sleeping is getting better. The knee still isn't bending like I want it to.    Changes to:  Allergies, Med Hx, Sx Hx?   no       TREATMENT AREA =  Right knee pain [M25.561]    OBJECTIVE    Modalities Rationale:     decrease edema, decrease inflammation, and decrease pain to improve patient's ability to progress to PLOF and address remaining functional goals.     min [] Estim Unattended, type/location:                                      []  w/ice    []  w/heat    min [] Estim Attended, type/location:                                     []  w/US     []  w/ice    []  w/heat    []  TENS insruct      min []  Mechanical Traction: type/lbs                   []  pro   []  sup   []  int   []  cont    []  before manual    []  after manual    min []  Ultrasound, settings/location:      min []  Iontophoresis w/ dexamethasone, location:                                               []  take home patch       []  in clinic        min  unbilled []  Ice     []  Heat    location/position:     min []  Paraffin,  details:    10 min [x]  Vasopneumatic Device, press/temp:     min []  Whirlpool / Fluido:    If using vaso (only need to measure limb vaso being performed on)      pre-treatment girth : 52cm      post-treatment girth : 51cm      measured at (landmark location) :  joint line     min []  Other:    Skin assessment post-treatment (if applicable):    [x]  intact    []  redness- no adverse reaction                 []redness - adverse reaction:         Therapeutic Procedures:  Tx Min Billable or 1:1 Min (if diff from Tx Min) Procedure,

## 2024-07-01 NOTE — PROGRESS NOTES
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, HTN, appendectomy    Prior Level of Function:walking with SPC, able to complete ADLs independently     Visits from Start of Care: 25    Missed Visits: 3    Reporting Period: 24 to 24    The Plan of Care and following information is based on the patient's current status:    Key functional changes: improvements noted in right knee ROM and strength, overall less pain      Problems/ barriers to goal attainment: poor pain tolerance, right knee ROM not WFL     Problem List: pain affecting function, decrease ROM, decrease strength, impaired gait/balance, decrease ADL/functional abilities, decrease activity tolerance, decrease flexibility/joint mobility, and decrease transfer abilities     Treatment Plan: Therapeutic exercise, Neuromuscular reeducation, Therapeutic activity, Self care/home management, Vasopneumatic device, and Gait training    Goals for this certification period to be accomplished in 4 weeks  Short Term Goals: STG- To be accomplished in 8 visit(s):  1.  Pt will be independent with HEP to encourage prophylaxis.  Eval: HEP dispensed   PN: compliance per pt report goal MET 24     2. Pt right knee AROM will improve to 5 -90* to increase ease with transfers  Eval: 20-70*  PN: 4-108* AROM goal MET 24     Long Term Goals: LTG- To be accomplished in 28 visit(s):  1.  Pt will report max pain 1/10 to complete ADLs with

## 2024-07-03 ENCOUNTER — HOSPITAL ENCOUNTER (OUTPATIENT)
Facility: HOSPITAL | Age: 64
Setting detail: RECURRING SERIES
Discharge: HOME | End: 2024-07-06
Payer: COMMERCIAL

## 2024-07-03 PROCEDURE — 97110 THERAPEUTIC EXERCISES: CPT

## 2024-07-03 PROCEDURE — 97112 NEUROMUSCULAR REEDUCATION: CPT

## 2024-07-03 PROCEDURE — 97530 THERAPEUTIC ACTIVITIES: CPT

## 2024-07-03 PROCEDURE — 97016 VASOPNEUMATIC DEVICE THERAPY: CPT

## 2024-07-03 NOTE — PROGRESS NOTES
PHYSICAL / OCCUPATIONAL THERAPY - DAILY TREATMENT NOTE     Patient Name: King Moran    Date: 7/3/2024    : 1960  Insurance: Payor: [employer] [facility] / [facility] / Product Type: *No Product type* /      Patient  verified Yes     Visit #   Current / Total 26 28   Time   In / Out 9:54 10:42   Pain   In / Out 3 4   Subjective Functional Status/Changes: Pt reports continued difficulty descending stairs in comparison to ascending    Changes to:  Allergies, Med Hx, Sx Hx?   no       TREATMENT AREA =  Right knee pain [M25.561]    OBJECTIVE    Modalities Rationale:     decrease edema, decrease inflammation, and decrease pain to improve patient's ability to progress to PLOF and address remaining functional goals.     min [] Estim Unattended, type/location:                                      []  w/ice    []  w/heat    min [] Estim Attended, type/location:                                     []  w/US     []  w/ice    []  w/heat    []  TENS insruct      min []  Mechanical Traction: type/lbs                   []  pro   []  sup   []  int   []  cont    []  before manual    []  after manual    min []  Ultrasound, settings/location:      min []  Iontophoresis w/ dexamethasone, location:                                               []  take home patch       []  in clinic        min  unbilled []  Ice     []  Heat    location/position:     min []  Paraffin,  details:    10 min [x]  Vasopneumatic Device, press/temp: Med/Low    min []  Whirlpool / Fluido:    If using vaso (only need to measure limb vaso being performed on)      pre-treatment girth : 51 cm      post-treatment girth : cm      measured at (landmark location) :  Mid-patella    min []  Other:    Skin assessment post-treatment (if applicable):    [x]  intact    []  redness- no adverse reaction                 []redness - adverse reaction:         Therapeutic Procedures:  Tx Min Billable or 1:1 Min (if diff from Tx Min)

## 2024-07-05 ENCOUNTER — HOSPITAL ENCOUNTER (OUTPATIENT)
Facility: HOSPITAL | Age: 64
Setting detail: RECURRING SERIES
Discharge: HOME | End: 2024-07-08
Payer: COMMERCIAL

## 2024-07-05 PROCEDURE — 97016 VASOPNEUMATIC DEVICE THERAPY: CPT

## 2024-07-05 PROCEDURE — 97530 THERAPEUTIC ACTIVITIES: CPT

## 2024-07-05 PROCEDURE — 97112 NEUROMUSCULAR REEDUCATION: CPT

## 2024-07-05 PROCEDURE — 97110 THERAPEUTIC EXERCISES: CPT

## 2024-07-05 NOTE — PROGRESS NOTES
PHYSICAL / OCCUPATIONAL THERAPY - DAILY TREATMENT NOTE     Patient Name: King Moran    Date: 2024    : 1960  Insurance: Payor: [employer] [facility] / [facility] / Product Type: *No Product type* /      Patient  verified Yes     Visit #   Current / Total 27 40   Time   In / Out 9:52 10:52   Pain   In / Out 2 2   Subjective Functional Status/Changes: \"I went to the orthopedic yesterday she was pleased with my progress.\"   Changes to:  Allergies, Med Hx, Sx Hx?   no       TREATMENT AREA =  Right knee pain [M25.561]    OBJECTIVE    Modalities Rationale:     decrease edema, decrease inflammation, and decrease pain to improve patient's ability to progress to PLOF and address remaining functional goals.     min [] Estim Unattended, type/location:                                      []  w/ice    []  w/heat    min [] Estim Attended, type/location:                                     []  w/US     []  w/ice    []  w/heat    []  TENS insruct      min []  Mechanical Traction: type/lbs                   []  pro   []  sup   []  int   []  cont    []  before manual    []  after manual    min []  Ultrasound, settings/location:      min []  Iontophoresis w/ dexamethasone, location:                                               []  take home patch       []  in clinic        min  unbilled []  Ice     []  Heat    location/position:     min []  Paraffin,  details:    10 min [x]  Vasopneumatic Device, press/temp: Med/Low    min []  Whirlpool / Fluido:    If using vaso (only need to measure limb vaso being performed on)      pre-treatment girth : 50.5 cm      post-treatment girth : 49.6 cm      measured at (landmark location) :  Mid-patella    min []  Other:    Skin assessment post-treatment (if applicable):    [x]  intact    []  redness- no adverse reaction                 []redness - adverse reaction:         Therapeutic Procedures:  Tx Min Billable or 1:1 Min (if diff from Tx Min)

## 2024-07-08 ENCOUNTER — HOSPITAL ENCOUNTER (OUTPATIENT)
Facility: HOSPITAL | Age: 64
Setting detail: RECURRING SERIES
Discharge: HOME | End: 2024-07-11
Payer: COMMERCIAL

## 2024-07-08 PROCEDURE — 97110 THERAPEUTIC EXERCISES: CPT

## 2024-07-08 PROCEDURE — 97112 NEUROMUSCULAR REEDUCATION: CPT

## 2024-07-08 PROCEDURE — 97016 VASOPNEUMATIC DEVICE THERAPY: CPT

## 2024-07-08 PROCEDURE — 97530 THERAPEUTIC ACTIVITIES: CPT

## 2024-07-08 NOTE — PROGRESS NOTES
to progress toward prior level of activity.    Patient will continue to benefit from skilled PT / OT services to modify and progress therapeutic interventions, analyze and address functional mobility deficits, analyze and address ROM deficits, analyze and address strength deficits, analyze and cue for proper movement patterns, analyze and modify for postural abnormalities, analyze and address imbalance/dizziness, and instruct in home and community integration to address functional deficits and attain remaining goals.    Progress toward goals / Updated goals:  []  See Progress Note/Recertification    Short Term Goals: STG- To be accomplished in 8 visit(s):  1.  Pt will be independent with HEP to encourage prophylaxis.  Eval: HEP dispensed   PN: compliance per pt report goal MET 5/6/24     2. Pt right knee AROM will improve to 5 -90* to increase ease with transfers  Eval: 20-70*  PN: 4-108* AROM goal MET 7/1/24     Long Term Goals: LTG- To be accomplished in 28 visit(s):  1.  Pt will report max pain 1/10 to complete ADLs with increased ease.   Eval: 10/10 max pain  PN: 4/10 max pain progressing well 7/1/24  Current: 3-4/10 pain at worst in the past week 7/8/24     2.  Pt right LE strength will improve to at least 4+/5 to be able to manage stairs in home with increased ease  Eval:        Right   Hip Flexion 3+   Knee Flexion 2     Extension 2      PN: goal MET except hip flexion 6/3/24  Current: Increased resistance with seated hs curls/LAQ to progress toward functional strength goals for right L 7/5/24        Right   Hip Flexion 4   Knee Flexion 5     Extension 5         3.  Pt right knee AROM will improve to 0-120 to allow pt to complete transfers with increased ease.  Eval:20-70*  PN:4 -108* progressing 7/1/24   Current: 3-108 7/3/24     4.  Pt FOTO score will improve to 64  to show improvement in subjective function.  Eval:24  PN: 55 progressing 7/1/24  *FOTO score is an established functional score where 100 = no

## 2024-07-10 ENCOUNTER — HOSPITAL ENCOUNTER (OUTPATIENT)
Facility: HOSPITAL | Age: 64
Setting detail: RECURRING SERIES
Discharge: HOME | End: 2024-07-13
Payer: COMMERCIAL

## 2024-07-10 PROCEDURE — 97110 THERAPEUTIC EXERCISES: CPT

## 2024-07-10 PROCEDURE — 97112 NEUROMUSCULAR REEDUCATION: CPT

## 2024-07-10 PROCEDURE — 97016 VASOPNEUMATIC DEVICE THERAPY: CPT

## 2024-07-10 PROCEDURE — 97530 THERAPEUTIC ACTIVITIES: CPT

## 2024-07-10 NOTE — PROGRESS NOTES
PHYSICAL / OCCUPATIONAL THERAPY - DAILY TREATMENT NOTE     Patient Name: King Moran    Date: 7/10/2024    : 1960  Insurance: Payor: [employer] [facility] / [facility] / Product Type: *No Product type* /      Patient  verified Yes     Visit #   Current / Total 29 40   Time   In / Out 9:45 10:40   Pain   In / Out 3 2-3   Subjective Functional Status/Changes: Feeling a little sore from cutting my yard and my dad's yard yesterday   Changes to:  Allergies, Med Hx, Sx Hx?   no       TREATMENT AREA =  Right knee pain [M25.561]    OBJECTIVE    Modalities Rationale:     decrease edema, decrease inflammation, and decrease pain to improve patient's ability to progress to PLOF and address remaining functional goals.     min [] Estim Unattended, type/location:                                      []  w/ice    []  w/heat    min [] Estim Attended, type/location:                                     []  w/US     []  w/ice    []  w/heat    []  TENS insruct      min []  Mechanical Traction: type/lbs                   []  pro   []  sup   []  int   []  cont    []  before manual    []  after manual    min []  Ultrasound, settings/location:      min []  Iontophoresis w/ dexamethasone, location:                                               []  take home patch       []  in clinic        min  unbilled []  Ice     []  Heat    location/position:     min []  Paraffin,  details:    10 min [x]  Vasopneumatic Device, press/temp: Med/Low    min []  Whirlpool / Fluido:    If using vaso (only need to measure limb vaso being performed on)      pre-treatment girth : 48 cm      post-treatment girth : 47.6 cm      measured at (landmark location) :  Mid-patella    min []  Other:    Skin assessment post-treatment (if applicable):    [x]  intact    []  redness- no adverse reaction                 []redness - adverse reaction:         Therapeutic Procedures:  Tx Min Billable or 1:1 Min (if diff from Tx Min)

## 2024-07-12 ENCOUNTER — HOSPITAL ENCOUNTER (OUTPATIENT)
Facility: HOSPITAL | Age: 64
Setting detail: RECURRING SERIES
Discharge: HOME | End: 2024-07-15
Payer: COMMERCIAL

## 2024-07-12 PROCEDURE — 97110 THERAPEUTIC EXERCISES: CPT

## 2024-07-12 PROCEDURE — 97530 THERAPEUTIC ACTIVITIES: CPT

## 2024-07-12 PROCEDURE — 97016 VASOPNEUMATIC DEVICE THERAPY: CPT

## 2024-07-12 PROCEDURE — 97112 NEUROMUSCULAR REEDUCATION: CPT

## 2024-07-12 NOTE — PROGRESS NOTES
PHYSICAL / OCCUPATIONAL THERAPY - DAILY TREATMENT NOTE     Patient Name: King Moran    Date: 2024    : 1960  Insurance: Payor: [employer] [facility] / [facility] / Product Type: *No Product type* /      Patient  verified Yes     Visit #   Current / Total 30 40   Time   In / Out 950 1050   Pain   In / Out 3 3   Subjective Functional Status/Changes: I am having trouble balancing on just my right leg. I can do it on the left    Changes to:  Allergies, Med Hx, Sx Hx?   no       TREATMENT AREA =  Right knee pain [M25.561]    OBJECTIVE    Modalities Rationale:     decrease edema, decrease inflammation, and decrease pain to improve patient's ability to progress to PLOF and address remaining functional goals.     min [] Estim Unattended, type/location:                                      []  w/ice    []  w/heat    min [] Estim Attended, type/location:                                     []  w/US     []  w/ice    []  w/heat    []  TENS insruct      min []  Mechanical Traction: type/lbs                   []  pro   []  sup   []  int   []  cont    []  before manual    []  after manual    min []  Ultrasound, settings/location:      min []  Iontophoresis w/ dexamethasone, location:                                               []  take home patch       []  in clinic        min  unbilled []  Ice     []  Heat    location/position:     min []  Paraffin,  details:    10 min [x]  Vasopneumatic Device, press/temp:     min []  Whirlpool / Fluido:    If using vaso (only need to measure limb vaso being performed on)      pre-treatment girth : 50cm      post-treatment girth : 49cm      measured at (landmark location) :  joint line     min []  Other:    Skin assessment post-treatment (if applicable):    [x]  intact    []  redness- no adverse reaction                 []redness - adverse reaction:         Therapeutic Procedures:  Tx Min Billable or 1:1 Min (if diff from Tx Min) Procedure,

## 2024-07-15 ENCOUNTER — HOSPITAL ENCOUNTER (OUTPATIENT)
Facility: HOSPITAL | Age: 64
Setting detail: RECURRING SERIES
Discharge: HOME | End: 2024-07-18
Payer: COMMERCIAL

## 2024-07-15 PROCEDURE — 97530 THERAPEUTIC ACTIVITIES: CPT

## 2024-07-15 PROCEDURE — 97110 THERAPEUTIC EXERCISES: CPT

## 2024-07-15 PROCEDURE — 97112 NEUROMUSCULAR REEDUCATION: CPT

## 2024-07-15 PROCEDURE — 97016 VASOPNEUMATIC DEVICE THERAPY: CPT

## 2024-07-15 NOTE — PROGRESS NOTES
will be able to walk with without AD with good gait mechanics to return to PLOF  Eval: RW with decreased right LE WB, decreased heel strike, knee flexion, right foot ER  PN:no longer using AD however decreased paulino and left step length progressing 7/1/24  Current: bilateral foot ER with right lateral lean progressing 7/12/24    PLAN  Yes  Continue plan of care  []  Upgrade activities as tolerated  []  Discharge due to :  []  Other:    Herbert Max PTA    7/15/2024    7:45 AM    Future Appointments   Date Time Provider Department Center   7/15/2024  9:50 AM Herbert Max PTA [facility]   7/17/2024  9:50 AM Herbert Max PTA [facility]   7/19/2024  9:50 AM Josefina Diggs, PT [facility]   7/22/2024  9:50 AM Herbert Max PTA [facility]   7/24/2024  9:50 AM Herbert Max PTA [facility]   7/26/2024  9:50 AM Josefina Diggs, PT [facility]   7/29/2024  9:50 AM Herbert Max PTA [facility]   7/31/2024  9:50 AM Herbert Max PTA [facility]

## 2024-07-17 ENCOUNTER — HOSPITAL ENCOUNTER (OUTPATIENT)
Facility: HOSPITAL | Age: 64
Setting detail: RECURRING SERIES
Discharge: HOME | End: 2024-07-20
Payer: COMMERCIAL

## 2024-07-17 PROCEDURE — 97016 VASOPNEUMATIC DEVICE THERAPY: CPT

## 2024-07-17 PROCEDURE — 97110 THERAPEUTIC EXERCISES: CPT

## 2024-07-17 PROCEDURE — 97530 THERAPEUTIC ACTIVITIES: CPT

## 2024-07-17 PROCEDURE — 97112 NEUROMUSCULAR REEDUCATION: CPT

## 2024-07-17 NOTE — PROGRESS NOTES
PHYSICAL / OCCUPATIONAL THERAPY - DAILY TREATMENT NOTE     Patient Name: King Moran    Date: 2024    : 1960  Insurance: Payor: [employer] [facility] / [facility] / Product Type: *No Product type* /      Patient  verified Yes     Visit #   Current / Total 32 40   Time   In / Out 9:49 10:45   Pain   In / Out 2 2   Subjective Functional Status/Changes: Patient stated that he continues to have difficulty standing on right leg to balance and prolonged standing.    Changes to:  Allergies, Med Hx, Sx Hx?   no       TREATMENT AREA =  Right knee pain [M25.561]    OBJECTIVE    Modalities Rationale:     decrease edema, decrease inflammation, and decrease pain to improve patient's ability to progress to PLOF and address remaining functional goals.     min [] Estim Unattended, type/location:                                      []  w/ice    []  w/heat    min [] Estim Attended, type/location:                                     []  w/US     []  w/ice    []  w/heat    []  TENS insruct      min []  Mechanical Traction: type/lbs                   []  pro   []  sup   []  int   []  cont    []  before manual    []  after manual    min []  Ultrasound, settings/location:      min []  Iontophoresis w/ dexamethasone, location:                                               []  take home patch       []  in clinic        min  unbilled []  Ice     []  Heat    location/position:     min []  Paraffin,  details:    10 min []  Vasopneumatic Device, press/temp: LP/LT    min []  Whirlpool / Fluido:    If using vaso (only need to measure limb vaso being performed on)      pre-treatment girth : 50cm       post-treatment girth : 49.5cm      measured at (landmark location) :  mid patella    min []  Other:    Skin assessment post-treatment (if applicable):    []  intact    []  redness- no adverse reaction                 []redness - adverse reaction:         Therapeutic Procedures:  Tx Min Billable or

## 2024-07-19 ENCOUNTER — HOSPITAL ENCOUNTER (OUTPATIENT)
Facility: HOSPITAL | Age: 64
Setting detail: RECURRING SERIES
Discharge: HOME | End: 2024-07-22
Payer: COMMERCIAL

## 2024-07-19 PROCEDURE — 97112 NEUROMUSCULAR REEDUCATION: CPT

## 2024-07-19 PROCEDURE — 97530 THERAPEUTIC ACTIVITIES: CPT

## 2024-07-19 PROCEDURE — 97110 THERAPEUTIC EXERCISES: CPT

## 2024-07-19 PROCEDURE — 97016 VASOPNEUMATIC DEVICE THERAPY: CPT

## 2024-07-19 NOTE — PROGRESS NOTES
to show improvement in subjective function.  Eval:24  PN: 55 progressing 7/1/24  *FOTO score is an established functional score where 100 = no disability*     5. Pt will be able to walk with without AD with good gait mechanics to return to PLOF  Eval: RW with decreased right LE WB, decreased heel strike, knee flexion, right foot ER  PN:no longer using AD however decreased paulino and left step length progressing 7/1/24  Current: bilateral foot ER with right lateral lean progressing 7/12/24    PLAN  Yes  Continue plan of care  []  Upgrade activities as tolerated  []  Discharge due to :  []  Other:    Herbert Max PTA    7/19/2024    7:30 AM    Future Appointments   Date Time Provider Department Center   7/19/2024  9:50 AM Herbert Max PTA [facility]   7/22/2024  9:50 AM Herbert Max PTA [facility]   7/24/2024  9:50 AM Herbert Max PTA [facility]   7/26/2024  9:50 AM Josefina Diggs, PT [facility]   7/29/2024  9:50 AM Herbert Max PTA [facility]   7/31/2024  9:50 AM Herbert Max PTA [facility]

## 2024-07-22 ENCOUNTER — HOSPITAL ENCOUNTER (OUTPATIENT)
Facility: HOSPITAL | Age: 64
Setting detail: RECURRING SERIES
Discharge: HOME | End: 2024-07-25
Payer: COMMERCIAL

## 2024-07-22 PROCEDURE — 97530 THERAPEUTIC ACTIVITIES: CPT

## 2024-07-22 PROCEDURE — 97016 VASOPNEUMATIC DEVICE THERAPY: CPT

## 2024-07-22 PROCEDURE — 97110 THERAPEUTIC EXERCISES: CPT

## 2024-07-22 PROCEDURE — 97112 NEUROMUSCULAR REEDUCATION: CPT

## 2024-07-22 NOTE — PROGRESS NOTES
4.  Pt FOTO score will improve to 64  to show improvement in subjective function.  Eval:24  PN: 55 progressing 7/1/24  *FOTO score is an established functional score where 100 = no disability*     5. Pt will be able to walk with without AD with good gait mechanics to return to PLOF  Eval: RW with decreased right LE WB, decreased heel strike, knee flexion, right foot ER  PN:no longer using AD however decreased paulino and left step length progressing 7/1/24  Current: Increased resistance with glut strengthening exercises at mat table to improve standing tolerance/quality of gait pattern 7/22/24  PLAN  Yes  Continue plan of care  []  Upgrade activities as tolerated  []  Discharge due to :  []  Other:    Herbert Max PTA    7/22/2024    7:38 AM    Future Appointments   Date Time Provider Department Center   7/22/2024  9:50 AM Herbert Max PTA [facility]   7/24/2024  9:50 AM [facility]   7/26/2024  9:50 AM Josefina Diggs, PT [facility]   7/29/2024  9:50 AM [facility]   7/31/2024  9:50 AM Brigida Topete, PT [facility]

## 2024-07-24 ENCOUNTER — HOSPITAL ENCOUNTER (OUTPATIENT)
Facility: HOSPITAL | Age: 64
Setting detail: RECURRING SERIES
Discharge: HOME | End: 2024-07-27
Payer: COMMERCIAL

## 2024-07-24 PROCEDURE — 97016 VASOPNEUMATIC DEVICE THERAPY: CPT

## 2024-07-24 PROCEDURE — 97112 NEUROMUSCULAR REEDUCATION: CPT

## 2024-07-24 PROCEDURE — 97530 THERAPEUTIC ACTIVITIES: CPT

## 2024-07-24 PROCEDURE — 97110 THERAPEUTIC EXERCISES: CPT

## 2024-07-24 NOTE — PROGRESS NOTES
[facility] [place]  [address]  [place]  [address]  Phone: [phone]      Fax:  [phone]      Continued Plan of Care/ Re-certification for Physical Therapy Services    Patient name: King Moran Start of Care: 24   Referral source: Ousmane Brooks* : 1960   Medical/Treatment Diagnosis: Right knee pain [M25.561] Onset Date:DOS 4/3/24     Prior Hospitalization: see medical history Provider#: [id number]   Medications: Verified on Patient Summary List      Comorbidities: Other: left TKR 2019 infection after, HTN, appendectomy     Prior Level of Function:walking with SPC, able to complete ADLs independently     Visits from Start of Care: 25    Missed Visits: 3    Reporting Period: 24 to 24    The Plan of Care and following information is based on the patient's current status:    Key functional changes: improvements noted right knee ROM, right LE strength, overall less pain      Problems/ barriers to goal attainment: decreased glut strength, challenged with SLS     Problem List: pain affecting function, decrease ROM, decrease strength, edema affection function, impaired gait/balance, decrease ADL/functional abilities, decrease activity tolerance, decrease flexibility/joint mobility, and decrease transfer abilities     Treatment Plan: Therapeutic exercise, Neuromuscular reeducation, Therapeutic activity, Self care/home management, Vasopneumatic device, and Gait training    Goals for this certification period to be accomplished in 4 weeks  Short Term Goals: STG- To be accomplished in 8 visit(s):  1.  Pt will be independent with HEP to encourage prophylaxis.  Eval: HEP dispensed   PN: compliance per pt report goal MET 24     2. Pt right knee AROM will improve to 5 -90* to increase ease with transfers  Eval: 20-70*  PN: 4-108* AROM goal MET 24     Long Term Goals: LTG- To be accomplished in 28 visit(s):  1.  Pt will report max pain /10

## 2024-07-24 NOTE — PROGRESS NOTES
PHYSICAL / OCCUPATIONAL THERAPY - DAILY TREATMENT NOTE     Patient Name: King Moran    Date: 2024    : 1960  Insurance: Payor: [employer] [employer] [facility] / Plan: [facility] [employer] [employer] [employer] / Product Type: *No Product type* /      Patient  verified Yes     Visit #   Current / Total 35 40   Time   In / Out 950 1059   Pain   In / Out 2 3   Subjective Functional Status/Changes: Still having trouble with the balance    Changes to:  Allergies, Med Hx, Sx Hx?   no       TREATMENT AREA =  Right knee pain [M25.561]    If an interpreting service is utilized for treatment of this patient, the contents of this document represent the material reviewed with the patient via the .     OBJECTIVE    Modalities Rationale:     decrease edema, decrease inflammation, and decrease pain to improve patient's ability to progress to PLOF and address remaining functional goals.     min [] Estim Unattended, type/location:                                      []  w/ice    []  w/heat    min [] Estim Attended, type/location:                                     []  w/US     []  w/ice    []  w/heat    []  TENS insruct      min []  Mechanical Traction: type/lbs                   []  pro   []  sup   []  int   []  cont    []  before manual    []  after manual    min []  Ultrasound, settings/location:      min []  Iontophoresis w/ dexamethasone, location:                                               []  take home patch       []  in clinic        min  unbilled []  Ice     []  Heat    location/position:     min []  Paraffin,  details:    10 min [x]  Vasopneumatic Device, press/temp:     min []  Whirlpool / Fluido:    If using vaso (only need to measure limb vaso being performed on)      pre-treatment girth : 50cm      post-treatment girth : 50cm      measured at (landmark location) : joint line      min []  Other:    Skin assessment post-treatment (if applicable):    [x]  intact    []  redness- no adverse reaction

## 2024-07-26 ENCOUNTER — HOSPITAL ENCOUNTER (OUTPATIENT)
Facility: HOSPITAL | Age: 64
Setting detail: RECURRING SERIES
Discharge: HOME | End: 2024-07-29
Payer: COMMERCIAL

## 2024-07-26 PROCEDURE — 97530 THERAPEUTIC ACTIVITIES: CPT

## 2024-07-26 PROCEDURE — 97110 THERAPEUTIC EXERCISES: CPT

## 2024-07-26 PROCEDURE — 97016 VASOPNEUMATIC DEVICE THERAPY: CPT

## 2024-07-26 PROCEDURE — 97112 NEUROMUSCULAR REEDUCATION: CPT

## 2024-07-26 NOTE — PROGRESS NOTES
adverse reaction:         Therapeutic Procedures:  Tx Min Billable or 1:1 Min (if diff from Tx Min) Procedure, Rationale, Specifics   [id number]  [id number] Therapeutic Exercise (timed):  increase ROM, strength, coordination, balance, and proprioception to improve patient's ability to progress to PLOF and address remaining functional goals. (see flow sheet as applicable)    Details if applicable:       15  22446 Neuromuscular Re-Education (timed):  improve balance, coordination, kinesthetic sense, posture, core stability and proprioception to improve patient's ability to develop conscious control of individual muscles and awareness of position of extremities in order to progress to PLOF and address remaining functional goals. (see flow sheet as applicable)    Details if applicable:     15  62461 Therapeutic Activity (timed):  use of dynamic activities replicating functional movements to increase ROM, strength, coordination, balance, and proprioception in order to improve patient's ability to progress to PLOF and address remaining functional goals.  (see flow sheet as applicable)     Details if applicable:           Details if applicable:            Details if applicable:     67  [facility] Totals Reminder: bill using total billable min of TIMED therapeutic procedures (example: do not include dry needle or estim unattended, both untimed codes, in totals to left)  8-22 min = 1 unit; 23-37 min = 2 units; 38-52 min = 3 units; 53-67 min = 4 units; 68-82 min = 5 units   Total Total     TOTAL TREATMENT TIME:        77     [x]  Patient Education billed concurrently with other procedures   [x] Review HEP    [] Progressed/Changed HEP, detail:    [] Other detail:       Objective Information/Functional Measures/Assessment    Pt tolerated session well with reporting no increase in pain post session. Continues to be appropriately challenged with interventions with adding in heel downs and SB hip hike today to improve glut and LE strength with good

## 2024-07-29 ENCOUNTER — TELEPHONE (OUTPATIENT)
Facility: HOSPITAL | Age: 64
End: 2024-07-29

## 2024-07-29 ENCOUNTER — HOSPITAL ENCOUNTER (OUTPATIENT)
Facility: HOSPITAL | Age: 64
Setting detail: RECURRING SERIES
End: 2024-07-29
Payer: COMMERCIAL

## 2024-07-31 ENCOUNTER — HOSPITAL ENCOUNTER (OUTPATIENT)
Facility: HOSPITAL | Age: 64
Setting detail: RECURRING SERIES
Discharge: HOME | End: 2024-08-03
Payer: COMMERCIAL

## 2024-07-31 PROCEDURE — 97016 VASOPNEUMATIC DEVICE THERAPY: CPT

## 2024-07-31 PROCEDURE — 97112 NEUROMUSCULAR REEDUCATION: CPT

## 2024-07-31 PROCEDURE — 97530 THERAPEUTIC ACTIVITIES: CPT

## 2024-07-31 PROCEDURE — 97110 THERAPEUTIC EXERCISES: CPT

## 2024-07-31 NOTE — PROGRESS NOTES
PHYSICAL / OCCUPATIONAL THERAPY - DAILY TREATMENT NOTE     Patient Name: King Moran    Date: 2024    : 1960  Insurance: Payor: [employer] [facility] / [facility] / Product Type: *No Product type* /      Patient  verified Yes     Visit #   Current / Total 37 52   Time   In / Out 9:52 10:43   Pain   In / Out 1 2   Subjective Functional Status/Changes: Patient stated that he has been walking more without the cane.    Changes to:  Allergies, Med Hx, Sx Hx?   no       TREATMENT AREA =  Right knee pain [M25.561]    If an interpreting service is utilized for treatment of this patient, the contents of this document represent the material reviewed with the patient via the .     OBJECTIVE    Modalities Rationale:     decrease edema, decrease inflammation, and decrease pain to improve patient's ability to progress to PLOF and address remaining functional goals.     min [] Estim Unattended, type/location:                                      []  w/ice    []  w/heat    min [] Estim Attended, type/location:                                     []  w/US     []  w/ice    []  w/heat    []  TENS insruct      min []  Mechanical Traction: type/lbs                   []  pro   []  sup   []  int   []  cont    []  before manual    []  after manual    min []  Ultrasound, settings/location:      min []  Iontophoresis w/ dexamethasone, location:                                               []  take home patch       []  in clinic        min  unbilled []  Ice     []  Heat    location/position:     min []  Paraffin,  details:    10 min []  Vasopneumatic Device, press/temp: LP/LT    min []  Whirlpool / Fluido:    If using vaso (only need to measure limb vaso being performed on)      pre-treatment girth :  49.5cm      post-treatment girth : 49cm      measured at (landmark location) :  mid patella    min []  Other:    Skin assessment post-treatment (if applicable):    []  intact    []

## 2024-08-02 ENCOUNTER — HOSPITAL ENCOUNTER (OUTPATIENT)
Facility: HOSPITAL | Age: 64
Setting detail: RECURRING SERIES
Discharge: HOME | End: 2024-08-05
Payer: COMMERCIAL

## 2024-08-02 PROCEDURE — 97016 VASOPNEUMATIC DEVICE THERAPY: CPT

## 2024-08-02 PROCEDURE — 97110 THERAPEUTIC EXERCISES: CPT

## 2024-08-02 PROCEDURE — 97530 THERAPEUTIC ACTIVITIES: CPT

## 2024-08-02 PROCEDURE — 97112 NEUROMUSCULAR REEDUCATION: CPT

## 2024-08-05 ENCOUNTER — HOSPITAL ENCOUNTER (OUTPATIENT)
Facility: HOSPITAL | Age: 64
Setting detail: RECURRING SERIES
Discharge: HOME | End: 2024-08-08
Payer: COMMERCIAL

## 2024-08-05 PROCEDURE — 97016 VASOPNEUMATIC DEVICE THERAPY: CPT

## 2024-08-05 PROCEDURE — 97116 GAIT TRAINING THERAPY: CPT

## 2024-08-05 PROCEDURE — 97110 THERAPEUTIC EXERCISES: CPT

## 2024-08-05 PROCEDURE — 97530 THERAPEUTIC ACTIVITIES: CPT

## 2024-08-05 PROCEDURE — 97112 NEUROMUSCULAR REEDUCATION: CPT

## 2024-08-05 NOTE — PROGRESS NOTES
Objective Information/Functional Measures/Assessment    Therapist provided cues for correct technique and muscle activation with exercises. Initiated dynamic gait and patient demonstrated increased difficulty with retro ambulation, but was able to self correct imbalances. Therapist provided SBA for safety. Patient reported no increase in pain at end of the session.     Patient will continue to benefit from skilled PT / OT services to modify and progress therapeutic interventions, analyze and address functional mobility deficits, analyze and address ROM deficits, analyze and address strength deficits, analyze and address soft tissue restrictions, analyze and cue for proper movement patterns, analyze and modify for postural abnormalities, and analyze and address imbalance/dizziness to address functional deficits and attain remaining goals.    Progress toward goals / Updated goals:  []  See Progress Note/Recertification    Short Term Goals: STG- To be accomplished in 8 visit(s):  1.  Pt will be independent with HEP to encourage prophylaxis.  Eval: HEP dispensed   PN: compliance per pt report goal MET 5/6/24     2. Pt right knee AROM will improve to 5 -90* to increase ease with transfers  Eval: 20-70*  PN: 4-108* AROM goal MET 7/1/24     Long Term Goals: LTG- To be accomplished in 28 visit(s):  1.  Pt will report max pain 1/10 to complete ADLs with increased ease.   Eval: 10/10 max pain  PN: 2-3/10 max pain progressing well 7/24/24  Current: No change: 2-3/10 ( 7/31/24)      2.  Pt right LE strength will improve to at least 4+/5 to be able to manage stairs in home with increased ease  Eval:        Right   Hip Flexion 3+   Knee Flexion 2     Extension 2      PN: goal MET 7/24/24        Right   Hip Flexion 4+   Knee Flexion 5     Extension 5         3.  Pt right knee AROM will improve to 0-120 to allow pt to complete transfers with increased ease.  Eval:20-70*  PN: 1-114* progressing 7/24/24     4.  Pt FOTO score will

## 2024-08-07 ENCOUNTER — HOSPITAL ENCOUNTER (OUTPATIENT)
Facility: HOSPITAL | Age: 64
Setting detail: RECURRING SERIES
Discharge: HOME | End: 2024-08-10
Payer: COMMERCIAL

## 2024-08-07 PROCEDURE — 97112 NEUROMUSCULAR REEDUCATION: CPT

## 2024-08-07 PROCEDURE — 97530 THERAPEUTIC ACTIVITIES: CPT

## 2024-08-07 PROCEDURE — 97016 VASOPNEUMATIC DEVICE THERAPY: CPT

## 2024-08-07 PROCEDURE — 97110 THERAPEUTIC EXERCISES: CPT

## 2024-08-07 NOTE — PROGRESS NOTES
taps, SLS and hurdles due to decreased balance. Increased fatigued noted with bent over donkey kicks and extension with 3# ankle weight. Right knee flexion reaching 114* with no change since last PN however increased ease with reaching end range.     Patient will continue to benefit from skilled PT / OT services to modify and progress therapeutic interventions, analyze and address functional mobility deficits, analyze and address ROM deficits, analyze and address strength deficits, analyze and cue for proper movement patterns, analyze and modify for postural abnormalities, analyze and address imbalance/dizziness, and instruct in home and community integration to address functional deficits and attain remaining goals.    Progress toward goals / Updated goals:  []  See Progress Note/Recertification    Short Term Goals: STG- To be accomplished in 8 visit(s):  1.  Pt will be independent with HEP to encourage prophylaxis.  Eval: HEP dispensed   PN: compliance per pt report goal MET 5/6/24     2. Pt right knee AROM will improve to 5 -90* to increase ease with transfers  Eval: 20-70*  PN: 4-108* AROM goal MET 7/1/24     Long Term Goals: LTG- To be accomplished in 28 visit(s):  1.  Pt will report max pain 1/10 to complete ADLs with increased ease.   Eval: 10/10 max pain  PN: 2-3/10 max pain progressing well 7/24/24  Current: No change: 2-3/10 ( 7/31/24)      2.  Pt right LE strength will improve to at least 4+/5 to be able to manage stairs in home with increased ease  Eval:        Right   Hip Flexion 3+   Knee Flexion 2     Extension 2      PN: goal MET 7/24/24        Right   Hip Flexion 4+   Knee Flexion 5     Extension 5         3.  Pt right knee AROM will improve to 0-120 to allow pt to complete transfers with increased ease.  Eval:20-70*  PN: 1-114* progressing 7/24/24  Current:114* no change  8/7/24     4.  Pt FOTO score will improve to 64  to show improvement in subjective function.  Eval:24  PN: 46 progressing

## 2024-08-09 ENCOUNTER — HOSPITAL ENCOUNTER (OUTPATIENT)
Facility: HOSPITAL | Age: 64
Setting detail: RECURRING SERIES
Discharge: HOME | End: 2024-08-12
Payer: COMMERCIAL

## 2024-08-09 PROCEDURE — 97110 THERAPEUTIC EXERCISES: CPT

## 2024-08-09 PROCEDURE — 97530 THERAPEUTIC ACTIVITIES: CPT

## 2024-08-09 PROCEDURE — 97112 NEUROMUSCULAR REEDUCATION: CPT

## 2024-08-09 PROCEDURE — 97016 VASOPNEUMATIC DEVICE THERAPY: CPT

## 2024-08-09 NOTE — PROGRESS NOTES
PHYSICAL / OCCUPATIONAL THERAPY - DAILY TREATMENT NOTE     Patient Name: King Moran    Date: 2024    : 1960  Insurance: Payor: [employer] [facility] / Product Type: *No Product type* /      Patient  verified Yes     Visit #   Current / Total 41 52   Time   In / Out 9:50 10:43   Pain   In / Out 2 3   Subjective Functional Status/Changes: Pt reports no new complaints   Changes to:  Allergies, Med Hx, Sx Hx?   no       TREATMENT AREA =  Right knee pain [M25.561]    If an interpreting service is utilized for treatment of this patient, the contents of this document represent the material reviewed with the patient via the .     OBJECTIVE    Modalities Rationale:     decrease edema, decrease inflammation, and decrease pain to improve patient's ability to progress to PLOF and address remaining functional goals.     min [] Estim Unattended, type/location:                                      []  w/ice    []  w/heat    min [] Estim Attended, type/location:                                     []  w/US     []  w/ice    []  w/heat    []  TENS insruct      min []  Mechanical Traction: type/lbs                   []  pro   []  sup   []  int   []  cont    []  before manual    []  after manual    min []  Ultrasound, settings/location:      min []  Iontophoresis w/ dexamethasone, location:                                               []  take home patch       []  in clinic        min  unbilled []  Ice     []  Heat    location/position:     min []  Paraffin,  details:    10 min [x]  Vasopneumatic Device, press/temp: Med/Low    min []  Whirlpool / Fluido:    If using vaso (only need to measure limb vaso being performed on)      pre-treatment girth : 49.8 cm      post-treatment girth : 49.2 cm      measured at (landmark location) :  Mid patella    min []  Other:    Skin assessment post-treatment (if applicable):    [x]  intact    []  redness- no adverse reaction

## 2024-08-12 ENCOUNTER — HOSPITAL ENCOUNTER (OUTPATIENT)
Facility: HOSPITAL | Age: 64
Setting detail: RECURRING SERIES
Discharge: HOME | End: 2024-08-15
Payer: COMMERCIAL

## 2024-08-12 PROCEDURE — 97016 VASOPNEUMATIC DEVICE THERAPY: CPT

## 2024-08-12 PROCEDURE — 97530 THERAPEUTIC ACTIVITIES: CPT

## 2024-08-12 PROCEDURE — 97110 THERAPEUTIC EXERCISES: CPT

## 2024-08-12 PROCEDURE — 97112 NEUROMUSCULAR REEDUCATION: CPT

## 2024-08-12 NOTE — PROGRESS NOTES
PHYSICAL / OCCUPATIONAL THERAPY - DAILY TREATMENT NOTE     Patient Name: King Moran    Date: 2024    : 1960  Insurance: Payor: [employer] [facility] / Product Type: *No Product type* /      Patient  verified Yes     Visit #   Current / Total 42 52   Time   In / Out 9:53 10:45   Pain   In / Out 1-2 1-2   Subjective Functional Status/Changes: Patient reports pain in low back due to hitting a rut when he was on the riding law mower on Saturday and is adrian his back.    Changes to:  Allergies, Med Hx, Sx Hx?   no       TREATMENT AREA =  Right knee pain [M25.561]    If an interpreting service is utilized for treatment of this patient, the contents of this document represent the material reviewed with the patient via the .     OBJECTIVE    Modalities Rationale:      to improve patient's ability to progress to PLOF and address remaining functional goals.     min [] Estim Unattended, type/location:                                      []  w/ice    []  w/heat    min [] Estim Attended, type/location:                                     []  w/US     []  w/ice    []  w/heat    []  TENS insruct      min []  Mechanical Traction: type/lbs                   []  pro   []  sup   []  int   []  cont    []  before manual    []  after manual    min []  Ultrasound, settings/location:      min []  Iontophoresis w/ dexamethasone, location:                                               []  take home patch       []  in clinic        min  unbilled []  Ice     []  Heat    location/position:     min []  Paraffin,  details:    10 min []  Vasopneumatic Device, press/temp: LP/LT    min []  Whirlpool / Fluido:    If using vaso (only need to measure limb vaso being performed on)      pre-treatment girth : 49.5      post-treatment girth : 49.0cm      measured at (landmark location) :  mid patella    min []  Other:    Skin assessment post-treatment (if applicable):    []

## 2024-08-14 ENCOUNTER — HOSPITAL ENCOUNTER (OUTPATIENT)
Facility: HOSPITAL | Age: 64
Setting detail: RECURRING SERIES
Discharge: HOME | End: 2024-08-17
Payer: COMMERCIAL

## 2024-08-14 PROCEDURE — 97110 THERAPEUTIC EXERCISES: CPT

## 2024-08-14 PROCEDURE — 97530 THERAPEUTIC ACTIVITIES: CPT

## 2024-08-14 PROCEDURE — 97112 NEUROMUSCULAR REEDUCATION: CPT

## 2024-08-14 PROCEDURE — 97016 VASOPNEUMATIC DEVICE THERAPY: CPT

## 2024-08-14 NOTE — PROGRESS NOTES
PHYSICAL / OCCUPATIONAL THERAPY - DAILY TREATMENT NOTE     Patient Name: King Moran    Date: 2024    : 1960  Insurance: Payor: [employer] [facility] / [facility] / Product Type: *No Product type* /      Patient  verified Yes     Visit #   Current / Total 43 52   Time   In / Out 9:50 10:41   Pain   In / Out 2 2-3   Subjective Functional Status/Changes: My legs are still a little sore from walking more than usual over the past few days   Changes to:  Allergies, Med Hx, Sx Hx?   no       TREATMENT AREA =  Right knee pain [M25.561]    If an interpreting service is utilized for treatment of this patient, the contents of this document represent the material reviewed with the patient via the .     OBJECTIVE    Modalities Rationale:     decrease edema, decrease inflammation, and decrease pain to improve patient's ability to progress to PLOF and address remaining functional goals.     min [] Estim Unattended, type/location:                                      []  w/ice    []  w/heat    min [] Estim Attended, type/location:                                     []  w/US     []  w/ice    []  w/heat    []  TENS insruct      min []  Mechanical Traction: type/lbs                   []  pro   []  sup   []  int   []  cont    []  before manual    []  after manual    min []  Ultrasound, settings/location:      min []  Iontophoresis w/ dexamethasone, location:                                               []  take home patch       []  in clinic        min  unbilled []  Ice     []  Heat    location/position:     min []  Paraffin,  details:    10 min [x]  Vasopneumatic Device, press/temp: Med/Low    min []  Whirlpool / Fluido:    If using vaso (only need to measure limb vaso being performed on)      pre-treatment girth : 49.8 cm      post-treatment girth : 49 cm      measured at (landmark location) :  Mid patella    min []  Other:    Skin assessment post-treatment (if

## 2024-08-16 ENCOUNTER — HOSPITAL ENCOUNTER (OUTPATIENT)
Facility: HOSPITAL | Age: 64
Setting detail: RECURRING SERIES
Discharge: HOME | End: 2024-08-19
Payer: COMMERCIAL

## 2024-08-16 PROCEDURE — 97110 THERAPEUTIC EXERCISES: CPT

## 2024-08-16 PROCEDURE — 97016 VASOPNEUMATIC DEVICE THERAPY: CPT

## 2024-08-16 PROCEDURE — 97112 NEUROMUSCULAR REEDUCATION: CPT

## 2024-08-16 PROCEDURE — 97530 THERAPEUTIC ACTIVITIES: CPT

## 2024-08-16 NOTE — PROGRESS NOTES
PHYSICAL / OCCUPATIONAL THERAPY - DAILY TREATMENT NOTE     Patient Name: King Moran    Date: 2024    : 1960  Insurance: Payor: [employer] [facility] / [facility] / Product Type: *No Product type* /      Patient  verified Yes     Visit #   Current / Total 44 52   Time   In / Out 951 1046   Pain   In / Out Hip 3-4, knee 1-2 Hip 2-3, knee 1-2   Subjective Functional Status/Changes: Pt noted right hip soreness upon arrival   Changes to:  Allergies, Med Hx, Sx Hx?   no       TREATMENT AREA =  Right knee pain [M25.561]    If an interpreting service is utilized for treatment of this patient, the contents of this document represent the material reviewed with the patient via the .     OBJECTIVE    Modalities Rationale:     decrease edema, decrease inflammation, and decrease pain to improve patient's ability to progress to PLOF and address remaining functional goals.                   min [] Estim Unattended, type/location:                                                            []  w/ice    []  w/heat     min [] Estim Attended, type/location:                                                           []  w/US     []  w/ice    []  w/heat    []  TENS insruct       min []  Mechanical Traction: type/lbs                    []  pro   []  sup   []  int   []  cont    []  before manual    []  after manual     min []  Ultrasound, settings/location:        min []  Iontophoresis w/ dexamethasone, location:                                                []  take home patch       []  in clinic           min  unbilled []  Ice     []  Heat    location/position:       min []  Paraffin,  details:     10 min [x]  Vasopneumatic Device, press/temp: Med/Low     min []  Whirlpool / Fluido:     If using vaso (only need to measure limb vaso being performed on)      pre-treatment girth : 53 cm      post-treatment girth : 52 cm      measured at (landmark location) :  Mid patella     min

## 2024-08-19 ENCOUNTER — HOSPITAL ENCOUNTER (OUTPATIENT)
Facility: HOSPITAL | Age: 64
Setting detail: RECURRING SERIES
Discharge: HOME | End: 2024-08-22
Payer: COMMERCIAL

## 2024-08-19 PROCEDURE — 97112 NEUROMUSCULAR REEDUCATION: CPT

## 2024-08-19 PROCEDURE — 97110 THERAPEUTIC EXERCISES: CPT

## 2024-08-19 PROCEDURE — 97016 VASOPNEUMATIC DEVICE THERAPY: CPT

## 2024-08-19 PROCEDURE — 97530 THERAPEUTIC ACTIVITIES: CPT

## 2024-08-21 ENCOUNTER — APPOINTMENT (OUTPATIENT)
Facility: HOSPITAL | Age: 64
End: 2024-08-21
Payer: COMMERCIAL

## 2024-08-23 ENCOUNTER — HOSPITAL ENCOUNTER (OUTPATIENT)
Facility: HOSPITAL | Age: 64
Setting detail: RECURRING SERIES
Discharge: HOME | End: 2024-08-26
Payer: COMMERCIAL

## 2024-08-23 PROCEDURE — 97530 THERAPEUTIC ACTIVITIES: CPT

## 2024-08-23 PROCEDURE — 97016 VASOPNEUMATIC DEVICE THERAPY: CPT

## 2024-08-23 PROCEDURE — 97110 THERAPEUTIC EXERCISES: CPT

## 2024-08-23 PROCEDURE — 97112 NEUROMUSCULAR REEDUCATION: CPT

## 2024-08-23 NOTE — PROGRESS NOTES
PHYSICAL / OCCUPATIONAL THERAPY - DAILY TREATMENT NOTE     Patient Name: King Moran    Date: 2024    : 1960  Insurance: Payor: [facility] [employer] [facility] / Product Type: *No Product type* /      Patient  verified Yes     Visit #   Current / Total 46 52   Time   In / Out 9:50 10:40   Pain   In / Out 2 2   Subjective Functional Status/Changes: The heel is still bothering me. I should probably change the shoes I'm wearing    Changes to:  Allergies, Med Hx, Sx Hx?   no       TREATMENT AREA =  Right knee pain [M25.561]    If an interpreting service is utilized for treatment of this patient, the contents of this document represent the material reviewed with the patient via the .     OBJECTIVE    Modalities Rationale:     decrease edema, decrease inflammation, and decrease pain to improve patient's ability to progress to PLOF and address remaining functional goals.     min [] Estim Unattended, type/location:                                      []  w/ice    []  w/heat    min [] Estim Attended, type/location:                                     []  w/US     []  w/ice    []  w/heat    []  TENS insruct      min []  Mechanical Traction: type/lbs                   []  pro   []  sup   []  int   []  cont    []  before manual    []  after manual    min []  Ultrasound, settings/location:      min []  Iontophoresis w/ dexamethasone, location:                                               []  take home patch       []  in clinic        min  unbilled []  Ice     []  Heat    location/position:     min []  Paraffin,  details:    10 min [x]  Vasopneumatic Device, press/temp: Med/Low    min []  Whirlpool / Fluido:    If using vaso (only need to measure limb vaso being performed on)      pre-treatment girth : 49.8 cm      post-treatment girth : 49.4 cm      measured at (landmark location) :  joint line    min []  Other:    Skin assessment post-treatment (if applicable):

## 2024-08-26 ENCOUNTER — HOSPITAL ENCOUNTER (OUTPATIENT)
Facility: HOSPITAL | Age: 64
Setting detail: RECURRING SERIES
Discharge: HOME | End: 2024-08-29
Payer: COMMERCIAL

## 2024-08-26 PROCEDURE — 97530 THERAPEUTIC ACTIVITIES: CPT

## 2024-08-26 PROCEDURE — 97112 NEUROMUSCULAR REEDUCATION: CPT

## 2024-08-26 PROCEDURE — 97110 THERAPEUTIC EXERCISES: CPT

## 2024-08-26 PROCEDURE — 97016 VASOPNEUMATIC DEVICE THERAPY: CPT

## 2024-08-26 NOTE — PROGRESS NOTES
PHYSICAL / OCCUPATIONAL THERAPY - DAILY TREATMENT NOTE     Patient Name: King Moran    Date: 2024    : 1960  Insurance: Payor: [employer] [facility] / [facility] / Product Type: *No Product type* /      Patient  verified Yes     Visit #   Current / Total 47 52   Time   In / Out 9:52 10:42   Pain   In / Out 2 2   Subjective Functional Status/Changes: Lower back is hurting some from standing a lot at the shrimp festival in [facility] this weekend   Changes to:  Allergies, Med Hx, Sx Hx?   no       TREATMENT AREA =  Right knee pain [M25.561]    If an interpreting service is utilized for treatment of this patient, the contents of this document represent the material reviewed with the patient via the .     OBJECTIVE    Modalities Rationale:     decrease edema, decrease inflammation, and decrease pain to improve patient's ability to progress to PLOF and address remaining functional goals.     min [] Estim Unattended, type/location:                                      []  w/ice    []  w/heat    min [] Estim Attended, type/location:                                     []  w/US     []  w/ice    []  w/heat    []  TENS insruct      min []  Mechanical Traction: type/lbs                   []  pro   []  sup   []  int   []  cont    []  before manual    []  after manual    min []  Ultrasound, settings/location:      min []  Iontophoresis w/ dexamethasone, location:                                               []  take home patch       []  in clinic        min  unbilled []  Ice     []  Heat    location/position:     min []  Paraffin,  details:    10 min [x]  Vasopneumatic Device, press/temp: Med/Low    min []  Whirlpool / Fluido:    If using vaso (only need to measure limb vaso being performed on)      pre-treatment girth : 49.5 cm      post-treatment girth : 49.2 cm      measured at (landmark location) :  joint line    min []  Other:    Skin assessment post-treatment (if  heel and session continued focus on glut strengthening to continue improving standing tolerance/quality of gait. Pt required less rest breaks due to fatigue and reports no changes in pain post treatment. Pt will benefit from skilled PT services to improve functional independence.     Patient will continue to benefit from skilled PT / OT services to modify and progress therapeutic interventions, analyze and address functional mobility deficits, analyze and address ROM deficits, analyze and address strength deficits, analyze and address soft tissue restrictions, analyze and cue for proper movement patterns, analyze and modify for postural abnormalities, and instruct in home and community integration to address functional deficits and attain remaining goals.    Progress toward goals / Updated goals:  []  See Progress Note/Recertification    Short Term Goals: STG- To be accomplished in 8 visit(s):  1.  Pt will be independent with HEP to encourage prophylaxis.  Eval: HEP dispensed   PN: compliance per pt report goal MET 5/6/24     2. Pt right knee AROM will improve to 5 -90* to increase ease with transfers  Eval: 20-70*  PN: 4-108* AROM goal MET 7/1/24     Long Term Goals: LTG- To be accomplished in 28 visit(s):  1.  Pt will report max pain 1/10 to complete ADLs with increased ease.   Eval: 10/10 max pain  PN:2/10 max pain progressing well  8/19/24     2.  Pt right LE strength will improve to at least 4+/5 to be able to manage stairs in home with increased ease  Eval:        Right   Hip Flexion 3+   Knee Flexion 2     Extension 2      PN: goal MET 7/24/24        Right   Hip Flexion 4+   Knee Flexion 5     Extension 5         3.  Pt right knee AROM will improve to 0-120 to allow pt to complete transfers with increased ease.  Eval:20-70*  PN: 0- 111* slight regression in flexion, MET extension 8/19/24  Current: 0-112* knee flexion AROM 8/26/24     4.  Pt FOTO score will improve to 64  to show improvement in subjective

## 2024-08-28 ENCOUNTER — APPOINTMENT (OUTPATIENT)
Facility: HOSPITAL | Age: 64
End: 2024-08-28
Payer: COMMERCIAL

## 2024-08-30 ENCOUNTER — APPOINTMENT (OUTPATIENT)
Facility: HOSPITAL | Age: 64
End: 2024-08-30
Payer: COMMERCIAL

## 2024-09-06 ENCOUNTER — HOSPITAL ENCOUNTER (OUTPATIENT)
Facility: HOSPITAL | Age: 64
Setting detail: RECURRING SERIES
Discharge: HOME | End: 2024-09-09
Payer: COMMERCIAL

## 2024-09-06 PROCEDURE — 97110 THERAPEUTIC EXERCISES: CPT

## 2024-09-06 PROCEDURE — 97535 SELF CARE MNGMENT TRAINING: CPT

## 2024-09-06 PROCEDURE — 97530 THERAPEUTIC ACTIVITIES: CPT

## 2024-09-06 PROCEDURE — 97112 NEUROMUSCULAR REEDUCATION: CPT

## 2024-09-09 ENCOUNTER — HOSPITAL ENCOUNTER (OUTPATIENT)
Facility: HOSPITAL | Age: 64
Setting detail: RECURRING SERIES
End: 2024-09-09
Payer: COMMERCIAL

## 2024-09-09 ENCOUNTER — TELEPHONE (OUTPATIENT)
Facility: HOSPITAL | Age: 64
End: 2024-09-09

## 2024-09-11 ENCOUNTER — HOSPITAL ENCOUNTER (OUTPATIENT)
Facility: HOSPITAL | Age: 64
Setting detail: RECURRING SERIES
End: 2024-09-11
Payer: COMMERCIAL

## 2024-09-11 ENCOUNTER — TELEPHONE (OUTPATIENT)
Facility: HOSPITAL | Age: 64
End: 2024-09-11

## 2024-09-13 ENCOUNTER — TELEPHONE (OUTPATIENT)
Facility: HOSPITAL | Age: 64
End: 2024-09-13

## 2024-09-16 ENCOUNTER — APPOINTMENT (OUTPATIENT)
Facility: HOSPITAL | Age: 64
End: 2024-09-16
Payer: COMMERCIAL

## 2024-09-16 ENCOUNTER — TELEPHONE (OUTPATIENT)
Facility: HOSPITAL | Age: 64
End: 2024-09-16

## 2024-09-17 ENCOUNTER — TELEPHONE (OUTPATIENT)
Facility: HOSPITAL | Age: 64
End: 2024-09-17

## 2024-09-18 ENCOUNTER — APPOINTMENT (OUTPATIENT)
Facility: HOSPITAL | Age: 64
End: 2024-09-18
Payer: COMMERCIAL

## 2024-09-24 ENCOUNTER — HOSPITAL ENCOUNTER (OUTPATIENT)
Facility: HOSPITAL | Age: 64
Setting detail: RECURRING SERIES
End: 2024-09-24
Payer: COMMERCIAL

## 2024-09-25 ENCOUNTER — APPOINTMENT (OUTPATIENT)
Facility: HOSPITAL | Age: 64
End: 2024-09-25
Payer: COMMERCIAL